# Patient Record
Sex: FEMALE | Race: WHITE | HISPANIC OR LATINO | Employment: UNEMPLOYED | URBAN - METROPOLITAN AREA
[De-identification: names, ages, dates, MRNs, and addresses within clinical notes are randomized per-mention and may not be internally consistent; named-entity substitution may affect disease eponyms.]

---

## 2017-01-26 RX ORDER — CITALOPRAM 20 MG/1
20 TABLET ORAL DAILY
Qty: 30 TAB | Refills: 4 | Status: SHIPPED | OUTPATIENT
Start: 2017-01-26 | End: 2017-04-04 | Stop reason: SDUPTHER

## 2017-01-26 RX ORDER — CITALOPRAM 20 MG/1
20 TABLET ORAL
COMMUNITY
Start: 2016-03-15 | End: 2017-01-26 | Stop reason: SDUPTHER

## 2017-01-26 NOTE — TELEPHONE ENCOUNTER
Was the patient seen in the last year in this department? Yes     Does patient have an active prescription for medications requested? No     Received Request Via: Patient     Patient also requesting refill on Augmentin

## 2017-03-17 ENCOUNTER — OFFICE VISIT (OUTPATIENT)
Dept: MEDICAL GROUP | Facility: MEDICAL CENTER | Age: 57
End: 2017-03-17
Payer: COMMERCIAL

## 2017-03-17 VITALS
HEART RATE: 78 BPM | HEIGHT: 69 IN | WEIGHT: 207 LBS | SYSTOLIC BLOOD PRESSURE: 128 MMHG | RESPIRATION RATE: 16 BRPM | TEMPERATURE: 98 F | OXYGEN SATURATION: 98 % | BODY MASS INDEX: 30.66 KG/M2 | DIASTOLIC BLOOD PRESSURE: 82 MMHG

## 2017-03-17 DIAGNOSIS — Z13.1 SCREENING FOR DIABETES MELLITUS: ICD-10-CM

## 2017-03-17 DIAGNOSIS — N39.0 RECURRENT UTI: ICD-10-CM

## 2017-03-17 DIAGNOSIS — Z13.0 SCREENING FOR DEFICIENCY ANEMIA: ICD-10-CM

## 2017-03-17 DIAGNOSIS — E55.9 VITAMIN D DEFICIENCY: ICD-10-CM

## 2017-03-17 DIAGNOSIS — Z12.31 ENCOUNTER FOR SCREENING MAMMOGRAM FOR BREAST CANCER: ICD-10-CM

## 2017-03-17 DIAGNOSIS — F51.01 PRIMARY INSOMNIA: ICD-10-CM

## 2017-03-17 DIAGNOSIS — E53.8 B12 DEFICIENCY: ICD-10-CM

## 2017-03-17 DIAGNOSIS — F34.1 DYSTHYMIA: ICD-10-CM

## 2017-03-17 DIAGNOSIS — N95.2 VAGINAL ATROPHY: ICD-10-CM

## 2017-03-17 DIAGNOSIS — Z13.29 SCREENING FOR THYROID DISORDER: ICD-10-CM

## 2017-03-17 DIAGNOSIS — E78.2 MIXED HYPERLIPIDEMIA: ICD-10-CM

## 2017-03-17 PROBLEM — E78.5 HYPERLIPIDEMIA: Status: ACTIVE | Noted: 2017-03-17

## 2017-03-17 PROCEDURE — 99214 OFFICE O/P EST MOD 30 MIN: CPT | Performed by: FAMILY MEDICINE

## 2017-03-17 RX ORDER — TRAZODONE HYDROCHLORIDE 50 MG/1
TABLET ORAL
Refills: 3 | COMMUNITY
Start: 2016-12-21 | End: 2017-04-04 | Stop reason: SDUPTHER

## 2017-03-17 RX ORDER — ESTRADIOL 10 UG/1
INSERT VAGINAL
COMMUNITY
Start: 2016-03-15 | End: 2018-01-30 | Stop reason: SDUPTHER

## 2017-03-17 RX ORDER — LOVASTATIN 20 MG/1
20 TABLET ORAL
COMMUNITY
Start: 2016-03-15 | End: 2017-03-31 | Stop reason: SDUPTHER

## 2017-03-17 RX ORDER — TRAZODONE HYDROCHLORIDE 50 MG/1
50 TABLET ORAL
COMMUNITY
Start: 2016-03-15 | End: 2017-11-03 | Stop reason: SDUPTHER

## 2017-03-17 RX ORDER — DICLOFENAC SODIUM 75 MG/1
75 TABLET, DELAYED RELEASE ORAL
COMMUNITY
Start: 2016-03-15 | End: 2017-03-31 | Stop reason: SDUPTHER

## 2017-03-17 RX ORDER — AMOXICILLIN AND CLAVULANATE POTASSIUM 875; 125 MG/1; MG/1
TABLET, FILM COATED ORAL
Refills: 3 | COMMUNITY
Start: 2017-01-27 | End: 2017-10-12 | Stop reason: SDUPTHER

## 2017-03-17 RX ORDER — ESTRADIOL 1 MG/1
1 TABLET ORAL
COMMUNITY
Start: 2016-03-15 | End: 2017-03-31 | Stop reason: SDUPTHER

## 2017-03-17 ASSESSMENT — PATIENT HEALTH QUESTIONNAIRE - PHQ9: CLINICAL INTERPRETATION OF PHQ2 SCORE: 2

## 2017-03-17 NOTE — MR AVS SNAPSHOT
"        Ave Dave   3/17/2017 9:40 AM   Office Visit   MRN: [id number]    Department:  [facility]   Dept Phone:  [phone]    Description:  Female : 1960   Provider:  Yandy Clark M.D.           Reason for Visit     Establish Care           Allergies as of 3/17/2017     Allergen Noted Reactions    Ciprofloxacin 2017   Swelling    Nitrofurantoin 2017   Anaphylaxis    Throat swelling    Rocephin  [Ceftriaxone] 2017   Anaphylaxis    Throat swelling/hives      You were diagnosed with     B12 deficiency   [099196]       Primary insomnia   [396566]       Vitamin D deficiency   [4638261]       Vaginal atrophy   [356634]       Mixed hyperlipidemia   [272.2.ICD-9-CM]       Dysthymia   [448286]       Recurrent UTI   [167432]       Screening for deficiency anemia   [798247]       Screening for diabetes mellitus   [V77.1.ICD-9-CM]       Screening for thyroid disorder   [V77.0.ICD-9-CM]       Encounter for screening mammogram for breast cancer   [6518533]         Vital Signs     Blood Pressure Pulse Temperature Respirations Height Weight    128/82 mmHg 78 36.7 °C (98 °F) 16 1.753 m (5' 9.02\") 93.895 kg (207 lb)    Body Mass Index Oxygen Saturation Smoking Status             30.55 kg/m2 98% Never Smoker          Basic Information     Date Of Birth Sex Race Ethnicity Preferred Language    1960 Female White Unknown English      Problem List              ICD-10-CM Priority Class Noted - Resolved    Dysthymia F34.1   3/15/2016 - Present    Vaginal atrophy N95.2   3/15/2016 - Present    Mixed hyperlipidemia E78.2   3/17/2017 - Present    Menopausal and perimenopausal disorder N95.9   3/15/2016 - Present    Vitamin D deficiency E55.9   2016 - Present    B12 deficiency E53.8   3/17/2017 - Present    Primary insomnia F51.01   3/17/2017 - Present    Recurrent UTI N39.0   3/17/2017 - Present      Health Maintenance        Date Due Completion Dates    MAMMOGRAM 3/11/2017 3/11/2016, " 3/11/2016    IMM DTaP/Tdap/Td Vaccine (2 - Td) 6/10/2025 6/10/2015    COLONOSCOPY 9/27/2026 9/27/2016            Current Immunizations     Influenza Vaccine Quad Inj (Pf) 10/22/2016    Tdap Vaccine 6/10/2015      Below and/or attached are the medications your provider expects you to take. Review all of your home medications and newly ordered medications with your provider and/or pharmacist. Follow medication instructions as directed by your provider and/or pharmacist. Please keep your medication list with you and share with your provider. Update the information when medications are discontinued, doses are changed, or new medications (including over-the-counter products) are added; and carry medication information at all times in the event of emergency situations     Allergies:  CIPROFLOXACIN - Swelling     NITROFURANTOIN - Anaphylaxis     Rocephin  - Anaphylaxis               Medications  Valid as of: March 17, 2017 - 10:53 AM    Generic Name Brand Name Tablet Size Instructions for use    Amoxicillin-Pot Clavulanate (Tab) AUGMENTIN 875-125 MG         Cholecalciferol (Cap) Cholecalciferol 95865 UNIT Take 50,000 Units by mouth.        Citalopram Hydrobromide (Tab) CELEXA 20 MG Take 1 Tab by mouth every day.        Diclofenac Sodium (Tablet Delayed Response) VOLTAREN 75 MG Take 75 mg by mouth.        Estradiol (Tab) ESTRACE 1 MG Take 1 mg by mouth.        Estradiol (Tab) VAGIFEM 10 MCG Insert  in vagina.        Lovastatin (Tab) MEVACOR 20 MG Take 20 mg by mouth.        TraZODone HCl (Tab) DESYREL 50 MG Take 50 mg by mouth.        TraZODone HCl (Tab) DESYREL 50 MG         .                 Medicines prescribed today were sent to:     [facility] [place], [place] - [address] [address]    Phone: [phone] Fax: [phone]    Open 24 Hours?: No    [facility] [address] [place] [address], [place] - [address]    [address] [facility] [facility] [address]    Phone:  [phone] Fax: [phone]    Open 24 Hours?: No      Medication refill instructions:       If your prescription bottle indicates you have medication refills left, it is not necessary to call your provider’s office. Please contact your pharmacy and they will refill your medication.    If your prescription bottle indicates you do not have any refills left, you may request refills at any time through one of the following ways: The online Possible Web system (except Urgent Care), by calling your provider’s office, or by asking your pharmacy to contact your provider’s office with a refill request. Medication refills are processed only during regular business hours and may not be available until the next business day. Your provider may request additional information or to have a follow-up visit with you prior to refilling your medication.   *Please Note: Medication refills are assigned a new Rx number when refilled electronically. Your pharmacy may indicate that no refills were authorized even though a new prescription for the same medication is available at the pharmacy. Please request the medicine by name with the pharmacy before contacting your provider for a refill.        Your To Do List     Future Labs/Procedures Complete By Expires    MA-SCREEN MAMMO W/CAD-BILAT  As directed 4/18/2018         Possible Web Access Code: [email or web address]  Expires: 4/16/2017 10:53 AM    Your email address is not on file at [facility].  Email Addresses are required for you to sign up for Possible Web, please contact [phone] to verify your personal information and to provide your email address prior to attempting to register for Possible Web.    Ave Dave  [address]  [place] [address]    Possible Web  A secure, online tool to manage your health information     [employer]’s Possible Web® is a secure, online tool that connects you to your personalized health information from the privacy of your home -- day or night - making it very  easy for you to manage your healthcare. Once the activation process is completed, you can even access your medical information using the Spectralmind alan, which is available for free in the Apple Alan store or Google Play store.     To learn more about Spectralmind, visit [email or web address]    There are two levels of access available (as shown below):   My Chart Features  [facility] Doctor [facility]  Specialists [facility]  Urgent  Care Non-[facility] Doctor   Email your healthcare team securely and privately 24/7 X X X    Manage appointments: schedule your next appointment; view details of past/upcoming appointments X      Request prescription refills. X      View recent personal medical records, including lab and immunizations X X X X   View health record, including health history, allergies, medications X X X X   Read reports about your outpatient visits, procedures, consult and ER notes X X X X   See your discharge summary, which is a recap of your hospital and/or ER visit that includes your diagnosis, lab results, and care plan X X  X     How to register for Spectralmind:  Once your e-mail address has been verified, follow the following steps to sign up for Spectralmind.     1. Go to  [email or web address]  2. Click on the Sign Up Now box, which takes you to the New Member Sign Up page. You will need to provide the following information:  a. Enter your Spectralmind Access Code exactly as it appears at the top of this page. (You will not need to use this code after you’ve completed the sign-up process. If you do not sign up before the expiration date, you must request a new code.)   b. Enter your date of birth.   c. Enter your home email address.   d. Click Submit, and follow the next screen’s instructions.  3. Create a [employer] ID. This will be your Spectralmind login ID and cannot be changed, so think of one that is secure and easy to remember.  4. Create a [employer] password. You can change your password at any  time.  5. Enter your Password Reset Question and Answer. This can be used at a later time if you forget your password.   6. Enter your e-mail address. This allows you to receive e-mail notifications when new information is available in [employer].  7. Click Sign Up. You can now view your health information.    For assistance activating your [employer] account, call [phone]

## 2017-03-22 LAB
25(OH)D3+25(OH)D2 SERPL-MCNC: 56.9 NG/ML (ref 30–100)
ALBUMIN SERPL-MCNC: 4.2 G/DL (ref 3.5–5.5)
ALBUMIN/GLOB SERPL: 1.8 {RATIO} (ref 1.2–2.2)
ALP SERPL-CCNC: 83 IU/L (ref 39–117)
ALT SERPL-CCNC: 30 IU/L (ref 0–32)
AST SERPL-CCNC: 22 IU/L (ref 0–40)
BASOPHILS # BLD AUTO: 0 X10E3/UL (ref 0–0.2)
BASOPHILS NFR BLD AUTO: 1 %
BILIRUB SERPL-MCNC: 0.4 MG/DL (ref 0–1.2)
BUN SERPL-MCNC: 14 MG/DL (ref 6–24)
BUN/CREAT SERPL: 20 (ref 9–23)
CALCIUM SERPL-MCNC: 9.1 MG/DL (ref 8.7–10.2)
CHLORIDE SERPL-SCNC: 105 MMOL/L (ref 96–106)
CHOLEST SERPL-MCNC: 187 MG/DL (ref 100–199)
CHOLEST/HDLC SERPL: 2.7 RATIO UNITS (ref 0–4.4)
CO2 SERPL-SCNC: 22 MMOL/L (ref 18–29)
COMMENT 011824: NORMAL
CREAT SERPL-MCNC: 0.69 MG/DL (ref 0.57–1)
EOSINOPHIL # BLD AUTO: 0.2 X10E3/UL (ref 0–0.4)
EOSINOPHIL NFR BLD AUTO: 5 %
ERYTHROCYTE [DISTWIDTH] IN BLOOD BY AUTOMATED COUNT: 14 % (ref 12.3–15.4)
GLOBULIN SER CALC-MCNC: 2.3 G/DL (ref 1.5–4.5)
GLUCOSE SERPL-MCNC: 89 MG/DL (ref 65–99)
HCT VFR BLD AUTO: 43.1 % (ref 34–46.6)
HDLC SERPL-MCNC: 70 MG/DL
HGB BLD-MCNC: 14.2 G/DL (ref 11.1–15.9)
IMM GRANULOCYTES # BLD: 0 X10E3/UL (ref 0–0.1)
IMM GRANULOCYTES NFR BLD: 0 %
IMMATURE CELLS  115398: NORMAL
LDLC SERPL CALC-MCNC: 92 MG/DL (ref 0–99)
LYMPHOCYTES # BLD AUTO: 1.8 X10E3/UL (ref 0.7–3.1)
LYMPHOCYTES NFR BLD AUTO: 37 %
MCH RBC QN AUTO: 29.5 PG (ref 26.6–33)
MCHC RBC AUTO-ENTMCNC: 32.9 G/DL (ref 31.5–35.7)
MCV RBC AUTO: 89 FL (ref 79–97)
MONOCYTES # BLD AUTO: 0.4 X10E3/UL (ref 0.1–0.9)
MONOCYTES NFR BLD AUTO: 7 %
MORPHOLOGY BLD-IMP: NORMAL
NEUTROPHILS # BLD AUTO: 2.4 X10E3/UL (ref 1.4–7)
NEUTROPHILS NFR BLD AUTO: 50 %
NRBC BLD AUTO-RTO: NORMAL %
PLATELET # BLD AUTO: 246 X10E3/UL (ref 150–379)
POTASSIUM SERPL-SCNC: 4.3 MMOL/L (ref 3.5–5.2)
PROT SERPL-MCNC: 6.5 G/DL (ref 6–8.5)
RBC # BLD AUTO: 4.82 X10E6/UL (ref 3.77–5.28)
SODIUM SERPL-SCNC: 144 MMOL/L (ref 134–144)
T4 FREE SERPL-MCNC: 1.1 NG/DL (ref 0.82–1.77)
TRIGL SERPL-MCNC: 123 MG/DL (ref 0–149)
TSH SERPL DL<=0.005 MIU/L-ACNC: 2.56 UIU/ML (ref 0.45–4.5)
VLDLC SERPL CALC-MCNC: 25 MG/DL (ref 5–40)
WBC # BLD AUTO: 4.8 X10E3/UL (ref 3.4–10.8)

## 2017-03-23 ENCOUNTER — TELEPHONE (OUTPATIENT)
Dept: MEDICAL GROUP | Facility: MEDICAL CENTER | Age: 57
End: 2017-03-23

## 2017-03-23 NOTE — TELEPHONE ENCOUNTER
----- Message from Yandy Clark M.D. sent at 3/23/2017 10:12 AM PDT -----  Please notify patient of their normal lab results.  They look fabulous.  Yandy Clark M.D.

## 2017-03-24 NOTE — ASSESSMENT & PLAN NOTE
Patient has a history of vitamin B-12  deficiency and was getting monthly B12 shots. She hasn't had one in about 10 months now. We are going arrange for her daughter who is a medical assistant to give her monthly shots at home.

## 2017-03-24 NOTE — ASSESSMENT & PLAN NOTE
Patient is currently using Vagifem twice a week and this seems to be working well. It has helped with the dyspareunia. She denies any vaginal bleeding.

## 2017-03-24 NOTE — PROGRESS NOTES
Chief Complaint   Patient presents with   • Establish Care      Ave is here to establish care at this office. she is a previous patient of mine at the [facility].      Ave Dave is a 56 y.o. female here to establish care and for evaluation and management of:        HPI:    Vitamin D deficiency  She has a history of vitamin D deficiency. She's been taking her vitamin D 50,000 units once a week. She notes that when she doesn't take the medication regularly she becomes more fatigued. She is due for labs. She denies any recent fractures.    Vaginal atrophy  Patient is currently using Vagifem twice a week and this seems to be working well. It has helped with the dyspareunia. She denies any vaginal bleeding.    Recurrent UTI  Patient has a history of recurrent UTIs. She is on Augmentin that she takes after sex or when she starts having symptoms. She has multiple drug allergies and this is truly the only medicine that works. She knows to call if she gets nausea, vomiting, flank pain or high fever.    Primary insomnia  Patient has a history of primary insomnia with difficulty getting to sleep most nights. She was started on trazodone and that has been very effective in treating this. Patient reports she wakes rested.    Mixed hyperlipidemia  Patient has a history of hyperlipidemia. This is been well controlled with lovastatin 20 mg daily. She is due for labs. She denies any abdominal pain, excess muscle pain, dark urine or jaundice.    Dysthymia  Patient's dysthymia has been well-controlled with the citalopram 20 mg daily. She is feeling happy overall and denies any suicidal thoughts or plans.    B12 deficiency  Patient has a history of vitamin B-12  deficiency and was getting monthly B12 shots. She hasn't had one in about 10 months now. We are going arrange for her daughter who is a medical assistant to give her monthly shots at home.        Allergies   Allergen Reactions   • Ciprofloxacin Swelling   • Nitrofurantoin  Anaphylaxis     Throat swelling   • Rocephin  [Ceftriaxone] Anaphylaxis     Throat swelling/hives       Current medicines (including changes today)  Current Outpatient Prescriptions   Medication Sig Dispense Refill   • Cholecalciferol (D3-50) 02472 UNIT Cap Take 50,000 Units by mouth.     • diclofenac EC (VOLTAREN) 75 MG Tablet Delayed Response Take 75 mg by mouth.     • estradiol (ESTRACE) 1 MG Tab Take 1 mg by mouth.     • estradiol (VAGIFEM) 10 MCG Tab Insert  in vagina.     • lovastatin (MEVACOR) 20 MG Tab Take 20 mg by mouth.     • trazodone (DESYREL) 50 MG Tab Take 50 mg by mouth.     • citalopram (CELEXA) 20 MG Tab Take 1 Tab by mouth every day. 30 Tab 4   • amoxicillin-clavulanate (AUGMENTIN) 875-125 MG Tab   3   • trazodone (DESYREL) 50 MG Tab   3     No current facility-administered medications for this visit.     She  has a past medical history of Vitamin D deficiency (2016); Vaginal atrophy (3/15/2016); Recurrent UTI (3/17/2017); Mixed hyperlipidemia (3/17/2017); Dysthymia (3/15/2016); and B12 deficiency (3/17/2017).  She  has past surgical history that includes abdominal hysterectomy total.  Social History   Substance Use Topics   • Smoking status: Never Smoker    • Smokeless tobacco: Never Used   • Alcohol Use: No     Social History     Social History Narrative     Family History   Problem Relation Age of Onset   • Cancer Mother 80     colon   • Heart Disease Father 86   • Other Brother      trauma     Family Status   Relation Status Death Age   • Mother     • Father     • Sister Alive    • Brother Alive    • Sister Alive    • Sister Alive    • Sister Alive    • Brother           ROS  No fever or chills.  No nausea or vomiting.  No chest pain or palpitations.  No cough or SOB.  No pain with urination or hematuria.  No black or bloody stools.  All other systems reviewed and are negative     Objective:     Blood pressure 128/82, pulse 78, temperature 36.7 °C (98 °F), resp.  "rate 16, height 1.753 m (5' 9.02\"), weight 93.895 kg (207 lb), SpO2 98 %. Body mass index is 30.55 kg/(m^2).  Physical Exam:      Well developed, well nourished.  Alert, oriented in no acute distress.  Psych: Eye contact is good, speech goal directed, affect calm  Eyes: conjunctiva non-injected, sclera non-icteric.  Neck Supple.  No adenopathy or masses in the neck or supraclavicular regions. No thyromegaly  Lungs: clear to auscultation bilaterally with good excursion. No wheezes or rhonchi  CV: regular rate and rhythm. No murmur  Abdomen: soft, nontender, no masses or organomegaly.  No rebound or guarding  Ext: no edema, color normal, vascularity normal, temperature normal      Assessment and Plan:   The following treatment plan was discussed    1. B12 deficiency  We will arrange for home injections.  - CBC WITH DIFFERENTIAL    2. Primary insomnia  Continue trazodone    3. Vitamin D deficiency  Check vitamin D level and adjust supplementation as needed  - VITAMIN D 25-HYDROXY    4. Vaginal atrophy  Continue Vagifem twice a week. Patient has plenty of medication at this time    5. Mixed hyperlipidemia  Check lipid panel. Adjust Mevacor as needed  - LIPID PANEL W/ CHOL/HDL RATIO    6. Dysthymia  Stable. Continue citalopram 20 mg daily.    7. Recurrent UTI  Refill Augmentin as needed.    8. Screening for deficiency anemia  Screening labs ordered.  Await results for counseling.    - CBC WITH DIFFERENTIAL    9. Screening for diabetes mellitus  Screening labs ordered.  Await results for counseling.    - COMP METABOLIC PANEL    10. Screening for thyroid disorder  Screening labs ordered.  Await results for counseling.    - TSH+FREE T4    11. Encounter for screening mammogram for breast cancer    - MA-SCREEN MAMMO W/CAD-BILAT; Future      Records reviewed    Any change or worsening of signs or symptoms, patient encouraged to follow-up or report to the emergency room for further evaluation. Patient understands and " agrees.    Followup: Return in about 1 year (around 3/17/2018).

## 2017-03-24 NOTE — ASSESSMENT & PLAN NOTE
She has a history of vitamin D deficiency. She's been taking her vitamin D 50,000 units once a week. She notes that when she doesn't take the medication regularly she becomes more fatigued. She is due for labs. She denies any recent fractures.

## 2017-03-24 NOTE — ASSESSMENT & PLAN NOTE
Patient has a history of recurrent UTIs. She is on Augmentin that she takes after sex or when she starts having symptoms. She has multiple drug allergies and this is truly the only medicine that works. She knows to call if she gets nausea, vomiting, flank pain or high fever.

## 2017-03-24 NOTE — ASSESSMENT & PLAN NOTE
Patient's dysthymia has been well-controlled with the citalopram 20 mg daily. She is feeling happy overall and denies any suicidal thoughts or plans.

## 2017-03-24 NOTE — ASSESSMENT & PLAN NOTE
Patient has a history of hyperlipidemia. This is been well controlled with lovastatin 20 mg daily. She is due for labs. She denies any abdominal pain, excess muscle pain, dark urine or jaundice.

## 2017-03-31 RX ORDER — LOVASTATIN 20 MG/1
20 TABLET ORAL DAILY
Qty: 90 TAB | Refills: 3 | Status: SHIPPED | OUTPATIENT
Start: 2017-03-31 | End: 2018-04-05 | Stop reason: SDUPTHER

## 2017-03-31 RX ORDER — ESTRADIOL 1 MG/1
1 TABLET ORAL DAILY
Qty: 90 TAB | Refills: 3 | Status: SHIPPED | OUTPATIENT
Start: 2017-03-31 | End: 2018-02-23

## 2017-03-31 RX ORDER — DICLOFENAC SODIUM 75 MG/1
75 TABLET, DELAYED RELEASE ORAL 2 TIMES DAILY PRN
Qty: 60 TAB | Refills: 6 | Status: SHIPPED | OUTPATIENT
Start: 2017-03-31 | End: 2018-05-15 | Stop reason: SDUPTHER

## 2017-03-31 NOTE — TELEPHONE ENCOUNTER
Was the patient seen in the last year in this department? Yes     Does patient have an active prescription for medications requested? No     Received Request Via: Pharmacy     Last seen: 3/17/2017 [place]

## 2017-03-31 NOTE — TELEPHONE ENCOUNTER
Was the patient seen in the last year in this department? Yes     Does patient have an active prescription for medications requested? No     Received Request Via: Pharmacy     Last seen: 03/17/2017

## 2017-04-04 RX ORDER — TRAZODONE HYDROCHLORIDE 50 MG/1
50 TABLET ORAL
Qty: 30 TAB | Refills: 6 | Status: SHIPPED | OUTPATIENT
Start: 2017-04-04 | End: 2019-03-20

## 2017-04-04 RX ORDER — CITALOPRAM 20 MG/1
20 TABLET ORAL DAILY
Qty: 30 TAB | Refills: 6 | Status: SHIPPED | OUTPATIENT
Start: 2017-04-04 | End: 2017-11-03 | Stop reason: SDUPTHER

## 2017-04-04 NOTE — TELEPHONE ENCOUNTER
Was the patient seen in the last year in this department? Yes     Does patient have an active prescription for medications requested? No     Received Request Via: Pharmacy     Last seen: 03/17/2017 [place]

## 2017-04-27 DIAGNOSIS — R30.0 DYSURIA: ICD-10-CM

## 2017-04-29 LAB
APPEARANCE UR: CLEAR
BACTERIA #/AREA URNS HPF: NORMAL /[HPF]
BILIRUB UR QL STRIP: NEGATIVE
CASTS URNS MICRO: NORMAL
CASTS URNS QL MICRO: NORMAL
COLOR UR: YELLOW
CRYSTALS URNS MICRO: NORMAL
EPI CELLS #/AREA URNS HPF: NORMAL /HPF
GLUCOSE UR QL: NEGATIVE
HGB UR QL STRIP: NEGATIVE
KETONES UR QL STRIP: NEGATIVE
LEUKOCYTE ESTERASE UR QL STRIP: NEGATIVE
MICRO URNS: NORMAL
MICRO URNS: NORMAL
MUCOUS THREADS URNS QL MICRO: NORMAL
NITRITE UR QL STRIP: NEGATIVE
PH UR STRIP: 7 [PH] (ref 5–7.5)
PROT UR QL STRIP: NEGATIVE
RBC #/AREA URNS HPF: NORMAL /HPF
RENAL EPI CELLS #/AREA URNS HPF: NORMAL /[HPF]
SP GR UR: 1 (ref 1–1.03)
T VAGINALIS URNS QL MICRO: NORMAL
UNIDENT CRYS URNS QL MICRO: NORMAL
URINALYSIS REFLEX  377202: NORMAL
URNS CMNT MICRO: NORMAL
UROBILINOGEN UR STRIP-MCNC: 0.2 MG/DL (ref 0.2–1)
WBC #/AREA URNS HPF: NORMAL /HPF
YEAST #/AREA URNS HPF: NORMAL /[HPF]

## 2017-05-02 ENCOUNTER — TELEPHONE (OUTPATIENT)
Dept: MEDICAL GROUP | Facility: MEDICAL CENTER | Age: 57
End: 2017-05-02

## 2017-05-02 NOTE — TELEPHONE ENCOUNTER
----- Message from Yandy Clark M.D. sent at 5/2/2017 12:33 PM PDT -----  Please notify patient of their normal lab results.  There was no infection.  Yandy Clark M.D.

## 2017-05-11 ENCOUNTER — TELEPHONE (OUTPATIENT)
Dept: MEDICAL GROUP | Facility: MEDICAL CENTER | Age: 57
End: 2017-05-11

## 2017-05-11 RX ORDER — CODEINE PHOSPHATE/GUAIFENESIN 10-100MG/5
5 LIQUID (ML) ORAL 3 TIMES DAILY PRN
Qty: 180 ML | Refills: 0 | Status: SHIPPED | OUTPATIENT
Start: 2017-05-11 | End: 2017-10-12 | Stop reason: SDUPTHER

## 2017-05-11 NOTE — TELEPHONE ENCOUNTER
1. Caller Name: Pt                      Call Back Number: [phone] (home)       2. Message: Cough x 2 weeks, She has taken Augmentin and not better. Should she be seen? Or have something else prescribed      3. Patient approves office to leave a detailed voicemail/MyChart message: yes

## 2017-05-12 RX ORDER — AZITHROMYCIN 250 MG/1
TABLET, FILM COATED ORAL
Qty: 6 TAB | Refills: 0 | Status: SHIPPED | OUTPATIENT
Start: 2017-05-12 | End: 2017-05-17

## 2017-08-17 ENCOUNTER — OFFICE VISIT (OUTPATIENT)
Dept: MEDICAL GROUP | Facility: MEDICAL CENTER | Age: 57
End: 2017-08-17
Payer: COMMERCIAL

## 2017-08-17 VITALS
BODY MASS INDEX: 29.92 KG/M2 | WEIGHT: 202 LBS | HEART RATE: 55 BPM | TEMPERATURE: 98.1 F | RESPIRATION RATE: 16 BRPM | HEIGHT: 69 IN | DIASTOLIC BLOOD PRESSURE: 78 MMHG | OXYGEN SATURATION: 98 % | SYSTOLIC BLOOD PRESSURE: 120 MMHG

## 2017-08-17 DIAGNOSIS — H53.9 VISUAL CHANGES: ICD-10-CM

## 2017-08-17 DIAGNOSIS — G44.229 CHRONIC TENSION-TYPE HEADACHE, NOT INTRACTABLE: ICD-10-CM

## 2017-08-17 PROCEDURE — 99213 OFFICE O/P EST LOW 20 MIN: CPT | Performed by: FAMILY MEDICINE

## 2017-08-17 RX ORDER — CYCLOBENZAPRINE HCL 5 MG
5-10 TABLET ORAL 3 TIMES DAILY PRN
Qty: 30 TAB | Refills: 0 | Status: SHIPPED | OUTPATIENT
Start: 2017-08-17 | End: 2018-12-04

## 2017-08-17 NOTE — MR AVS SNAPSHOT
"        Ave Dave   2017 10:00 AM   Office Visit   MRN: [id number]    Department:  [facility]   Dept Phone:  [phone]    Description:  Female : 1960   Provider:  Yandy Clark M.D.           Reason for Visit     Headache           Allergies as of 2017     Allergen Noted Reactions    Ciprofloxacin 2017   Swelling    Nitrofurantoin 2017   Anaphylaxis    Throat swelling    Rocephin  [Ceftriaxone] 2017   Anaphylaxis    Throat swelling/hives      You were diagnosed with     Chronic tension-type headache, not intractable   [756828]       Visual changes   [698684]         Vital Signs     Blood Pressure Pulse Temperature Respirations Height Weight    120/78 mmHg 55 36.7 °C (98.1 °F) 16 1.753 m (5' 9.02\") 91.627 kg (202 lb)    Body Mass Index Oxygen Saturation Smoking Status             29.82 kg/m2 98% Never Smoker          Basic Information     Date Of Birth Sex Race Ethnicity Preferred Language    1960 Female White  Origin (C[place]ican,Malian,Niuean,Dileep, etc) English      Problem List              ICD-10-CM Priority Class Noted - Resolved    Dysthymia F34.1   3/15/2016 - Present    Vaginal atrophy N95.2   3/15/2016 - Present    Mixed hyperlipidemia E78.2   3/17/2017 - Present    Menopausal and perimenopausal disorder N95.9   3/15/2016 - Present    Vitamin D deficiency E55.9   2016 - Present    B12 deficiency E53.8   3/17/2017 - Present    Primary insomnia F51.01   3/17/2017 - Present    Recurrent UTI N39.0   3/17/2017 - Present    Chronic tension-type headache, not intractable G44.229   2017 - Present    Visual changes H53.9   2017 - Present      Health Maintenance        Date Due Completion Dates    MAMMOGRAM 3/11/2017 3/11/2016    IMM INFLUENZA (1) 2017 10/22/2016    IMM DTaP/Tdap/Td Vaccine (2 - Td) 6/10/2025 6/10/2015    COLONOSCOPY 2026            Current Immunizations     Influenza Vaccine Quad Inj (Pf) " 10/22/2016    Tdap Vaccine 6/10/2015      Below and/or attached are the medications your provider expects you to take. Review all of your home medications and newly ordered medications with your provider and/or pharmacist. Follow medication instructions as directed by your provider and/or pharmacist. Please keep your medication list with you and share with your provider. Update the information when medications are discontinued, doses are changed, or new medications (including over-the-counter products) are added; and carry medication information at all times in the event of emergency situations     Allergies:  CIPROFLOXACIN - Swelling     NITROFURANTOIN - Anaphylaxis     Rocephin  - Anaphylaxis               Medications  Valid as of: August 17, 2017 - 10:56 AM    Generic Name Brand Name Tablet Size Instructions for use    Amoxicillin-Pot Clavulanate (Tab) AUGMENTIN 875-125 MG         Cholecalciferol (Cap) Cholecalciferol 64114 UNIT Take 1 Cap by mouth every 7 days.        Citalopram Hydrobromide (Tab) CELEXA 20 MG Take 1 Tab by mouth every day.        Cyclobenzaprine HCl (Tab) FLEXERIL 5 MG Take 1-2 Tabs by mouth 3 times a day as needed.        Diclofenac Sodium (Tablet Delayed Response) VOLTAREN 75 MG Take 1 Tab by mouth 2 times a day as needed.        Estradiol (Tab) VAGIFEM 10 MCG Insert  in vagina.        Estradiol (Tab) ESTRACE 1 MG Take 1 Tab by mouth every day.        Guaifenesin-Codeine (Syrup) TUSSI-ORGANIDIN -10 MG/5ML Take 5 mL by mouth 3 times a day as needed for Cough.        Lovastatin (Tab) MEVACOR 20 MG Take 1 Tab by mouth every day.        TraZODone HCl (Tab) DESYREL 50 MG Take 50 mg by mouth.        TraZODone HCl (Tab) DESYREL 50 MG Take 1 Tab by mouth every bedtime.        .                 Medicines prescribed today were sent to:     [facility] - [place], [address] [address]    Phone: [phone] Fax: [phone]    Open 24  Hours?: No    [id number] - [place], [place] - [address]    [address] [place] [address]    Phone: [phone] Fax: [phone]    Open 24 Hours?: No      Medication refill instructions:       If your prescription bottle indicates you have medication refills left, it is not necessary to call your provider’s office. Please contact your pharmacy and they will refill your medication.    If your prescription bottle indicates you do not have any refills left, you may request refills at any time through one of the following ways: The online Digital Folio system (except Urgent Care), by calling your provider’s office, or by asking your pharmacy to contact your provider’s office with a refill request. Medication refills are processed only during regular business hours and may not be available until the next business day. Your provider may request additional information or to have a follow-up visit with you prior to refilling your medication.   *Please Note: Medication refills are assigned a new Rx number when refilled electronically. Your pharmacy may indicate that no refills were authorized even though a new prescription for the same medication is available at the pharmacy. Please request the medicine by name with the pharmacy before contacting your provider for a refill.           Digital Folio Access Code: [email or web address]  Expires: 9/16/2017 10:56 AM    Digital Folio  A secure, online tool to manage your health information     [employer] Digital Folio® is a secure, online tool that connects you to your personalized health information from the privacy of your home -- day or night - making it very easy for you to manage your healthcare. Once the activation process is completed, you can even access your medical information using the Digital Folio alan, which is available for free in the Apple Alan store or Google Play store.     Digital Folio provides the following levels of access (as shown below):   My Chart Features   [facility]  Primary Care Doctor [facility]  Specialists [facility]  Urgent  Care Non-[facility]  Primary Care  Doctor   Email your healthcare team securely and privately 24/7 X X X    Manage appointments: schedule your next appointment; view details of past/upcoming appointments X      Request prescription refills. X      View recent personal medical records, including lab and immunizations X X X X   View health record, including health history, allergies, medications X X X X   Read reports about your outpatient visits, procedures, consult and ER notes X X X X   See your discharge summary, which is a recap of your hospital and/or ER visit that includes your diagnosis, lab results, and care plan. X X       How to register for [employer]:  1. Go to  [email or web address].  2. Click on the Sign Up Now box, which takes you to the New Member Sign Up page. You will need to provide the following information:  a. Enter your [employer] Access Code exactly as it appears at the top of this page. (You will not need to use this code after you’ve completed the sign-up process. If you do not sign up before the expiration date, you must request a new code.)   b. Enter your date of birth.   c. Enter your home email address.   d. Click Submit, and follow the next screen’s instructions.  3. Create a [employer] ID. This will be your [employer] login ID and cannot be changed, so think of one that is secure and easy to remember.  4. Create a [employer] password. You can change your password at any time.  5. Enter your Password Reset Question and Answer. This can be used at a later time if you forget your password.   6. Enter your e-mail address. This allows you to receive e-mail notifications when new information is available in [employer].  7. Click Sign Up. You can now view your health information.    For assistance activating your [employer] account, call [phone]

## 2017-08-25 NOTE — PROGRESS NOTES
Subjective:     Chief Complaint   Patient presents with   • Headache       Ave Dave is a 57 y.o. female here today for evaluation and management of:    Visual changes  Patient is having difficulty reading as well as seeing distances. She is unsure if this is causing her headaches. She is scheduled to see the eye doctor this weekend. She denies any trauma to the eye. No loss of visual fields or blackened areas.    Chronic tension-type headache, not intractable  Patient complains of headaches in the frontal area as well as the occipital area. The pain radiates down to her neck. She reports feels better if she rubs it. She denies any head trauma. She has not had any numbness or tingling. She takes ibuprofen or Voltaren and this helps that does not solve the problem. She has not tried any ice to the area.       Allergies   Allergen Reactions   • Ciprofloxacin Swelling   • Nitrofurantoin Anaphylaxis     Throat swelling   • Rocephin  [Ceftriaxone] Anaphylaxis     Throat swelling/hives       Current medicines (including changes today)  Current Outpatient Prescriptions   Medication Sig Dispense Refill   • cyclobenzaprine (FLEXERIL) 5 MG tablet Take 1-2 Tabs by mouth 3 times a day as needed. 30 Tab 0   • guaifenesin-codeine (TUSSI-ORGANIDIN NR) 100-10 MG/5ML syrup Take 5 mL by mouth 3 times a day as needed for Cough. 180 mL 0   • Cholecalciferol (D3-50) 64227 UNIT Cap Take 1 Cap by mouth every 7 days. 12 Cap 3   • citalopram (CELEXA) 20 MG Tab Take 1 Tab by mouth every day. 30 Tab 6   • trazodone (DESYREL) 50 MG Tab Take 1 Tab by mouth every bedtime. 30 Tab 6   • diclofenac EC (VOLTAREN) 75 MG Tablet Delayed Response Take 1 Tab by mouth 2 times a day as needed. 60 Tab 6   • estradiol (ESTRACE) 1 MG Tab Take 1 Tab by mouth every day. 90 Tab 3   • lovastatin (MEVACOR) 20 MG Tab Take 1 Tab by mouth every day. 90 Tab 3   • estradiol (VAGIFEM) 10 MCG Tab Insert  in vagina.     • trazodone (DESYREL) 50 MG Tab Take 50  "mg by mouth.     • amoxicillin-clavulanate (AUGMENTIN) 875-125 MG Tab   3     No current facility-administered medications for this visit.       She  has a past medical history of Vitamin D deficiency (2/29/2016); Vaginal atrophy (3/15/2016); Recurrent UTI (3/17/2017); Mixed hyperlipidemia (3/17/2017); Dysthymia (3/15/2016); and B12 deficiency (3/17/2017).    Patient Active Problem List    Diagnosis Date Noted   • Chronic tension-type headache, not intractable 08/17/2017   • Visual changes 08/17/2017   • Mixed hyperlipidemia 03/17/2017   • B12 deficiency 03/17/2017   • Primary insomnia 03/17/2017   • Recurrent UTI 03/17/2017   • Dysthymia 03/15/2016   • Vaginal atrophy 03/15/2016   • Menopausal and perimenopausal disorder 03/15/2016   • Vitamin D deficiency 02/29/2016       ROS   No fever or chills.  No nausea or vomiting.  No chest pain or palpitations.  No cough or SOB.  No pain with urination or hematuria.  No black or bloody stools.       Objective:     Blood pressure 120/78, pulse 55, temperature 36.7 °C (98.1 °F), resp. rate 16, height 1.753 m (5' 9.02\"), weight 91.627 kg (202 lb), SpO2 98 %. Body mass index is 29.82 kg/(m^2).   Physical Exam:  Well developed, well nourished.  Alert, oriented in no acute distress.  Eye contact is good, speech goal directed, affect calm  Eyes: conjunctiva non-injected, sclera non-icteric.PERRL. EOMs intact. Visual acuity 20/40  Ears: Pinna normal. TM pearly gray.   Nose: Nares are patent.  Normal mucosa  Mouth: Oral mucous membranes pink and moist with no lesions.  Neck Supple.  No adenopathy or masses in the neck or supraclavicular regions. No thyromegaly.  No visible deformity. No spinal tenderness to palpation but tenderness to palpation of the bilateral strap muscles as well as trapezius. ROM intact. Spurling's test negative.   Lungs: clear to auscultation bilaterally with good excursion. No wheezes or rhonchi  CV: regular rate and rhythm. No murmur  Head: Normocephalic " atraumatic  Neurological: Alert and oriented x 3. DTRs 2+ and symmetric. No cranial nerve deficit. Strength and sensation intact. Negative Rhomberg. No dysdiadochokinesia.             Assessment and Plan:   The following treatment plan was discussed    1. Chronic tension-type headache, not intractable  Flexeril as directed. Consider physical therapy. Encouraged patient to follow up with up, treat for visual check as this could be contributing to her headaches. If headaches worsen consider MRI  - cyclobenzaprine (FLEXERIL) 5 MG tablet; Take 1-2 Tabs by mouth 3 times a day as needed.  Dispense: 30 Tab; Refill: 0    2. Visual changes  Encouraged follow-up with optometry.    Any change or worsening of signs or symptoms, patient encouraged to follow-up or report to the emergency room for further evaluation. Patient understands and agrees.    Followup: Return if symptoms worsen or fail to improve.

## 2017-08-25 NOTE — ASSESSMENT & PLAN NOTE
Patient is having difficulty reading as well as seeing distances. She is unsure if this is causing her headaches. She is scheduled to see the eye doctor this weekend. She denies any trauma to the eye. No loss of visual fields or blackened areas.

## 2017-08-25 NOTE — ASSESSMENT & PLAN NOTE
Patient complains of headaches in the frontal area as well as the occipital area. The pain radiates down to her neck. She reports feels better if she rubs it. She denies any head trauma. She has not had any numbness or tingling. She takes ibuprofen or Voltaren and this helps that does not solve the problem. She has not tried any ice to the area.

## 2017-10-12 ENCOUNTER — OFFICE VISIT (OUTPATIENT)
Dept: MEDICAL GROUP | Facility: MEDICAL CENTER | Age: 57
End: 2017-10-12
Payer: COMMERCIAL

## 2017-10-12 VITALS
SYSTOLIC BLOOD PRESSURE: 132 MMHG | HEART RATE: 65 BPM | HEIGHT: 69 IN | OXYGEN SATURATION: 100 % | WEIGHT: 210 LBS | TEMPERATURE: 97.5 F | BODY MASS INDEX: 31.1 KG/M2 | DIASTOLIC BLOOD PRESSURE: 86 MMHG

## 2017-10-12 DIAGNOSIS — H66.003 ACUTE SUPPURATIVE OTITIS MEDIA OF BOTH EARS WITHOUT SPONTANEOUS RUPTURE OF TYMPANIC MEMBRANES, RECURRENCE NOT SPECIFIED: ICD-10-CM

## 2017-10-12 DIAGNOSIS — J40 BRONCHITIS: ICD-10-CM

## 2017-10-12 DIAGNOSIS — Z23 NEED FOR VACCINATION: ICD-10-CM

## 2017-10-12 PROCEDURE — 90686 IIV4 VACC NO PRSV 0.5 ML IM: CPT | Performed by: FAMILY MEDICINE

## 2017-10-12 PROCEDURE — 90471 IMMUNIZATION ADMIN: CPT | Performed by: FAMILY MEDICINE

## 2017-10-12 PROCEDURE — 99214 OFFICE O/P EST MOD 30 MIN: CPT | Mod: 25 | Performed by: FAMILY MEDICINE

## 2017-10-12 RX ORDER — AMOXICILLIN AND CLAVULANATE POTASSIUM 875; 125 MG/1; MG/1
1 TABLET, FILM COATED ORAL 2 TIMES DAILY
Qty: 20 TAB | Refills: 3 | Status: SHIPPED | OUTPATIENT
Start: 2017-10-12 | End: 2018-11-07 | Stop reason: SDUPTHER

## 2017-10-12 RX ORDER — CODEINE PHOSPHATE/GUAIFENESIN 10-100MG/5
5 LIQUID (ML) ORAL 3 TIMES DAILY PRN
Qty: 240 ML | Refills: 0 | Status: SHIPPED | OUTPATIENT
Start: 2017-10-12 | End: 2018-12-04

## 2017-10-12 RX ORDER — AZITHROMYCIN 250 MG/1
TABLET, FILM COATED ORAL
Qty: 6 TAB | Refills: 0 | Status: SHIPPED | OUTPATIENT
Start: 2017-10-12 | End: 2017-10-17

## 2017-10-19 NOTE — PROGRESS NOTES
Subjective:     Chief Complaint   Patient presents with   • Cough       Ave Dave is a 57 y.o. female here today for evaluation and management of:    Bronchitis  Illness: 14 days of illness including: nasal congestion, green/purulent rhinorrhea, sore throat, laryngitis, ear pain/congestion, facial pressure, , cough ,  Sinus pain and pressure: bilateral frontal  Symptoms negative for fever, chills,   Treatments tried: none   Since onset, symptoms are worse   Similarly ill exposures: yes  Medical history negative for asthma  She  reports that she has never smoked. She has never used smokeless tobacco.         Allergies   Allergen Reactions   • Ciprofloxacin Swelling   • Nitrofurantoin Anaphylaxis     Throat swelling   • Rocephin  [Ceftriaxone] Anaphylaxis     Throat swelling/hives       Current medicines (including changes today)  Current Outpatient Prescriptions   Medication Sig Dispense Refill   • amoxicillin-clavulanate (AUGMENTIN) 875-125 MG Tab Take 1 Tab by mouth 2 times a day. 20 Tab 3   • guaifenesin-codeine (TUSSI-ORGANIDIN NR) 100-10 MG/5ML syrup Take 5 mL by mouth 3 times a day as needed for Cough. 240 mL 0   • cyclobenzaprine (FLEXERIL) 5 MG tablet Take 1-2 Tabs by mouth 3 times a day as needed. 30 Tab 0   • Cholecalciferol (D3-50) 39590 UNIT Cap Take 1 Cap by mouth every 7 days. 12 Cap 3   • citalopram (CELEXA) 20 MG Tab Take 1 Tab by mouth every day. 30 Tab 6   • trazodone (DESYREL) 50 MG Tab Take 1 Tab by mouth every bedtime. 30 Tab 6   • diclofenac EC (VOLTAREN) 75 MG Tablet Delayed Response Take 1 Tab by mouth 2 times a day as needed. 60 Tab 6   • estradiol (ESTRACE) 1 MG Tab Take 1 Tab by mouth every day. 90 Tab 3   • lovastatin (MEVACOR) 20 MG Tab Take 1 Tab by mouth every day. 90 Tab 3   • estradiol (VAGIFEM) 10 MCG Tab Insert  in vagina.     • trazodone (DESYREL) 50 MG Tab Take 50 mg by mouth.       No current facility-administered medications for this visit.        She  has a past  "medical history of B12 deficiency (3/17/2017); Dysthymia (3/15/2016); Mixed hyperlipidemia (3/17/2017); Recurrent UTI (3/17/2017); Vaginal atrophy (3/15/2016); and Vitamin D deficiency (2/29/2016).    Patient Active Problem List    Diagnosis Date Noted   • Bronchitis 10/12/2017   • Acute suppurative otitis media of both ears without spontaneous rupture of tympanic membranes 10/12/2017   • Chronic tension-type headache, not intractable 08/17/2017   • Visual changes 08/17/2017   • Mixed hyperlipidemia 03/17/2017   • B12 deficiency 03/17/2017   • Primary insomnia 03/17/2017   • Recurrent UTI 03/17/2017   • Dysthymia 03/15/2016   • Vaginal atrophy 03/15/2016   • Menopausal and perimenopausal disorder 03/15/2016   • Vitamin D deficiency 02/29/2016       ROS   No fever or chills.  No nausea or vomiting.  No chest pain or palpitations.    No pain with urination or hematuria.  No black or bloody stools.       Objective:     Blood pressure 132/86, pulse 65, temperature 36.4 °C (97.5 °F), height 1.753 m (5' 9\"), weight 95.3 kg (210 lb), SpO2 100 %. Body mass index is 31.01 kg/m².   Physical Exam:  Well developed, well nourished.  Alert, oriented in no acute distress.  Eye contact is good, speech goal directed, affect calm  Eyes: conjunctiva non-injected, sclera non-icteric.  Ears: Pinna normal. TMWith erythema bilaterally and yellow purulent material behind the drums. Loss of normal landmarks  Nose: Nares are patent. Erythematous, swollen mucosa with yellow discharge   Mouth: Oral mucous membranes pink and moist with no lesions. Mild diffuse erythema with yellow PND   Neck Supple.  No adenopathy or masses in the neck or supraclavicular regions. No thyromegaly  Lungs: clear to auscultation bilaterally with good excursion. No wheezes or rhonchi  CV: regular rate and rhythm. No murmur        Assessment and Plan:   The following treatment plan was discussed    1. Bronchitis  Zithromax as directed. Cough syrup as needed for " nighttime use. Increase fluids and rest. Patient given a prescription of Augmentin to start if symptoms worsen  - amoxicillin-clavulanate (AUGMENTIN) 875-125 MG Tab; Take 1 Tab by mouth 2 times a day.  Dispense: 20 Tab; Refill: 3  - guaifenesin-codeine (TUSSI-ORGANIDIN NR) 100-10 MG/5ML syrup; Take 5 mL by mouth 3 times a day as needed for Cough.  Dispense: 240 mL; Refill: 0  - azithromycin (ZITHROMAX) 250 MG Tab; 2 tabs by mouth day 1, 1 tab by mouth days 2-5  Dispense: 6 Tab; Refill: 0    2. Acute suppurative otitis media of both ears without spontaneous rupture of tympanic membranes, recurrence not specified  See #1    3. Need for vaccination  Flu shot given  - INFLUENZA VACCINE QUAD INJ >3Y(PF)    Any change or worsening of signs or symptoms, patient encouraged to follow-up or report to the emergency room for further evaluation. Patient understands and agrees.    Followup: Return if symptoms worsen or fail to improve.

## 2017-10-19 NOTE — ASSESSMENT & PLAN NOTE
Illness: 14 days of illness including: nasal congestion, green/purulent rhinorrhea, sore throat, laryngitis, ear pain/congestion, facial pressure, , cough ,  Sinus pain and pressure: bilateral frontal  Symptoms negative for fever, chills,   Treatments tried: none   Since onset, symptoms are worse   Similarly ill exposures: yes  Medical history negative for asthma  She  reports that she has never smoked. She has never used smokeless tobacco.

## 2017-11-03 RX ORDER — TRAZODONE HYDROCHLORIDE 50 MG/1
50 TABLET ORAL NIGHTLY PRN
Qty: 30 TAB | Refills: 11 | Status: SHIPPED | OUTPATIENT
Start: 2017-11-03 | End: 2018-11-19 | Stop reason: SDUPTHER

## 2017-11-03 RX ORDER — CITALOPRAM 20 MG/1
20 TABLET ORAL DAILY
Qty: 30 TAB | Refills: 11 | Status: SHIPPED | OUTPATIENT
Start: 2017-11-03 | End: 2018-11-08 | Stop reason: SDUPTHER

## 2017-11-28 ENCOUNTER — TELEPHONE (OUTPATIENT)
Dept: MEDICAL GROUP | Facility: MEDICAL CENTER | Age: 57
End: 2017-11-28

## 2017-11-28 NOTE — TELEPHONE ENCOUNTER
1. Caller Name: Aleks                      Call Back Number: [phone] (home)       2. Message: Patient daughter states mother is trying to schedule mammogram, but is having trouble, Patient is having right side pain and the order needs to be changed to diagnostic mammogram. Please.      3. Patient approves office to leave a detailed voicemail/MyChart message: yes

## 2017-12-28 ENCOUNTER — TELEPHONE (OUTPATIENT)
Dept: RADIOLOGY | Facility: MEDICAL CENTER | Age: 57
End: 2017-12-28

## 2017-12-28 NOTE — TELEPHONE ENCOUNTER
Spoke w/ pt to [facility] @ [facility] on 12/29 @ 8:45 check in @ 8:30, reviewed prep and location

## 2017-12-29 ENCOUNTER — HOSPITAL ENCOUNTER (OUTPATIENT)
Dept: RADIOLOGY | Facility: MEDICAL CENTER | Age: 57
End: 2017-12-29
Attending: FAMILY MEDICINE
Payer: COMMERCIAL

## 2017-12-29 DIAGNOSIS — N64.4 BREAST PAIN: ICD-10-CM

## 2017-12-29 PROCEDURE — G0279 TOMOSYNTHESIS, MAMMO: HCPCS

## 2017-12-29 PROCEDURE — 76642 ULTRASOUND BREAST LIMITED: CPT | Mod: RT

## 2018-01-03 ENCOUNTER — TELEPHONE (OUTPATIENT)
Dept: MEDICAL GROUP | Facility: MEDICAL CENTER | Age: 58
End: 2018-01-03

## 2018-01-03 NOTE — TELEPHONE ENCOUNTER
----- Message from Yandy Clark M.D. sent at 1/2/2018  4:58 PM PST -----  Please notify patient of their normal lab results.  Yandy Clark M.D.

## 2018-01-31 RX ORDER — ESTRADIOL 10 UG/1
10 INSERT VAGINAL DAILY
Qty: 8 TAB | Refills: 0 | Status: SHIPPED | OUTPATIENT
Start: 2018-01-31 | End: 2018-02-23

## 2018-02-20 ENCOUNTER — TELEPHONE (OUTPATIENT)
Dept: MEDICAL GROUP | Facility: MEDICAL CENTER | Age: 58
End: 2018-02-20

## 2018-02-21 NOTE — TELEPHONE ENCOUNTER
1. Caller Name: Ave Dave                        Call Back Number: [phone] (home)       2. Message: Patient would like to know if there is an alternative medication she can take for Vagifem that would be more cost efficient patient states she is paying $140      3. Patient approves office to leave a detailed voicemail/MyChart message: yes     .

## 2018-02-23 DIAGNOSIS — N95.9 MENOPAUSAL AND PERIMENOPAUSAL DISORDER: ICD-10-CM

## 2018-02-23 RX ORDER — ESTRADIOL 0.1 MG/G
1 CREAM VAGINAL
Qty: 1 TUBE | Refills: 6 | Status: SHIPPED | OUTPATIENT
Start: 2018-02-23 | End: 2018-12-04 | Stop reason: SDUPTHER

## 2018-05-16 RX ORDER — DICLOFENAC SODIUM 75 MG/1
75 TABLET, DELAYED RELEASE ORAL 2 TIMES DAILY PRN
Qty: 60 TAB | Refills: 0 | Status: SHIPPED | OUTPATIENT
Start: 2018-05-16 | End: 2018-09-05 | Stop reason: SDUPTHER

## 2018-08-03 ENCOUNTER — OFFICE VISIT (OUTPATIENT)
Dept: MEDICAL GROUP | Facility: MEDICAL CENTER | Age: 58
End: 2018-08-03
Payer: COMMERCIAL

## 2018-08-03 VITALS
HEIGHT: 69 IN | SYSTOLIC BLOOD PRESSURE: 146 MMHG | RESPIRATION RATE: 20 BRPM | DIASTOLIC BLOOD PRESSURE: 88 MMHG | WEIGHT: 208 LBS | HEART RATE: 60 BPM | OXYGEN SATURATION: 96 % | TEMPERATURE: 98.2 F | BODY MASS INDEX: 30.81 KG/M2

## 2018-08-03 DIAGNOSIS — R31.0 GROSS HEMATURIA: ICD-10-CM

## 2018-08-03 DIAGNOSIS — N23 RENAL COLIC ON RIGHT SIDE: ICD-10-CM

## 2018-08-03 PROCEDURE — 99214 OFFICE O/P EST MOD 30 MIN: CPT | Performed by: FAMILY MEDICINE

## 2018-08-03 RX ORDER — KETOROLAC TROMETHAMINE 10 MG/1
10 TABLET, FILM COATED ORAL EVERY 6 HOURS PRN
Qty: 20 TAB | Refills: 0 | Status: SHIPPED | OUTPATIENT
Start: 2018-08-03 | End: 2018-12-04

## 2018-08-03 ASSESSMENT — PATIENT HEALTH QUESTIONNAIRE - PHQ9: CLINICAL INTERPRETATION OF PHQ2 SCORE: 0

## 2018-08-10 ENCOUNTER — HOSPITAL ENCOUNTER (OUTPATIENT)
Dept: RADIOLOGY | Facility: MEDICAL CENTER | Age: 58
End: 2018-08-10
Attending: FAMILY MEDICINE
Payer: COMMERCIAL

## 2018-08-10 DIAGNOSIS — N23 RENAL COLIC ON RIGHT SIDE: ICD-10-CM

## 2018-08-10 DIAGNOSIS — R31.0 GROSS HEMATURIA: ICD-10-CM

## 2018-08-10 PROCEDURE — 74176 CT ABD & PELVIS W/O CONTRAST: CPT

## 2018-08-14 ENCOUNTER — TELEPHONE (OUTPATIENT)
Dept: MEDICAL GROUP | Facility: MEDICAL CENTER | Age: 58
End: 2018-08-14

## 2018-08-14 DIAGNOSIS — R10.9 FLANK PAIN: ICD-10-CM

## 2018-08-14 DIAGNOSIS — M51.36 DDD (DEGENERATIVE DISC DISEASE), LUMBAR: ICD-10-CM

## 2018-08-14 NOTE — ASSESSMENT & PLAN NOTE
Patient complains of right flank pain.  She reports that this waxes and wanes.  She gets episodes of gross hematuria the pain increases and then resolves.  She then feels sore in the area afterwards.  She denies any numbness or tingling.  No loss of strength.  No loss of bowel or bladder function.  She denies any trauma to the area.

## 2018-08-14 NOTE — TELEPHONE ENCOUNTER
----- Message from Yandy Clark M.D. sent at 8/14/2018  6:11 AM PDT -----  Please notify patient of their normal  CT results.  She does not have any kidney stones so this may be coming from her back where she has arthritis.  Does she want to consider physical therapy?  Yandy Clark M.D.

## 2018-08-14 NOTE — PROGRESS NOTES
Subjective:     Chief Complaint   Patient presents with   • UTI     x 6 days; hematuria, lower back pain, and burning with urination       Ave Dave is a 58 y.o. female here today for evaluation and management of:    Renal colic on right side  Patient complains of right flank pain.  She reports that this waxes and wanes.  She gets episodes of gross hematuria the pain increases and then resolves.  She then feels sore in the area afterwards.  She denies any numbness or tingling.  No loss of strength.  No loss of bowel or bladder function.  She denies any trauma to the area.       Allergies   Allergen Reactions   • Ciprofloxacin Swelling   • Nitrofurantoin Anaphylaxis     Throat swelling   • Rocephin  [Ceftriaxone] Anaphylaxis     Throat swelling/hives       Current medicines (including changes today)  Current Outpatient Prescriptions   Medication Sig Dispense Refill   • ketorolac (TORADOL) 10 MG Tab Take 1 Tab by mouth every 6 hours as needed for Severe Pain. Do not take with Voltaren 20 Tab 0   • estradiol (ESTRACE) 1 MG Tab TAKE ONE TABLET BY MOUTH ONCE DAILY 90 Tab 1   • diclofenac EC (VOLTAREN) 75 MG Tablet Delayed Response TAKE 1 TAB BY MOUTH 2 TIMES A DAY AS NEEDED. 60 Tab 0   • lovastatin (MEVACOR) 20 MG Tab TAKE ONE TABLET BY MOUTH ONCE DAILY 90 Tab 2   • estradiol (ESTRACE) 0.1 MG/GM vaginal cream Insert 1 g in vagina every Monday, Wednesday, and Friday. 1 Tube 6   • trazodone (DESYREL) 50 MG Tab Take 1 Tab by mouth at bedtime as needed for Sleep. 30 Tab 11   • citalopram (CELEXA) 20 MG Tab Take 1 Tab by mouth every day. 30 Tab 11   • cyclobenzaprine (FLEXERIL) 5 MG tablet Take 1-2 Tabs by mouth 3 times a day as needed. 30 Tab 0   • Cholecalciferol (D3-50) 87923 UNIT Cap Take 1 Cap by mouth every 7 days. 12 Cap 3   • trazodone (DESYREL) 50 MG Tab Take 1 Tab by mouth every bedtime. 30 Tab 6   • amoxicillin-clavulanate (AUGMENTIN) 875-125 MG Tab Take 1 Tab by mouth 2 times a day. 20 Tab 3   •  "guaifenesin-codeine (TUSSI-ORGANIDIN NR) 100-10 MG/5ML syrup Take 5 mL by mouth 3 times a day as needed for Cough. 240 mL 0     No current facility-administered medications for this visit.        She  has a past medical history of B12 deficiency (3/17/2017); Dysthymia (3/15/2016); Mixed hyperlipidemia (3/17/2017); Recurrent UTI (3/17/2017); Vaginal atrophy (3/15/2016); and Vitamin D deficiency (2/29/2016).    Patient Active Problem List    Diagnosis Date Noted   • Renal colic on right side 08/03/2018   • Gross hematuria 08/03/2018   • Bronchitis 10/12/2017   • Acute suppurative otitis media of both ears without spontaneous rupture of tympanic membranes 10/12/2017   • Chronic tension-type headache, not intractable 08/17/2017   • Visual changes 08/17/2017   • Mixed hyperlipidemia 03/17/2017   • B12 deficiency 03/17/2017   • Primary insomnia 03/17/2017   • Recurrent UTI 03/17/2017   • Dysthymia 03/15/2016   • Vaginal atrophy 03/15/2016   • Menopausal and perimenopausal disorder 03/15/2016   • Vitamin D deficiency 02/29/2016       ROS   No fever or chills.  No nausea or vomiting.  No chest pain or palpitations.  No cough or SOB.  .  No black or bloody stools.       Objective:     Blood pressure 146/88, pulse 60, temperature 36.8 °C (98.2 °F), resp. rate 20, height 1.753 m (5' 9\"), weight 94.3 kg (208 lb), SpO2 96 %. Body mass index is 30.72 kg/m².   Physical Exam:  Well developed, well nourished.  Alert, oriented in no acute distress.  Eye contact is good, speech goal directed, affect calm  Eyes: conjunctiva non-injected, sclera non-icteric.  Neck Supple.  No adenopathy or masses in the neck or supraclavicular regions. No thyromegaly  Lungs: clear to auscultation bilaterally with good excursion. No wheezes or rhonchi  CV: regular rate and rhythm. No murmur  Abdomen: soft, nontender, no masses or organomegaly.  No rebound or guarding.  Right CVA tenderness present normal urinalysis.            Assessment and Plan:   The " following treatment plan was discussed    1. Renal colic on right side  CT to assess etiology.  Oral Toradol prescription given.  Consider urology referral  - CT-RENAL COLIC EVALUATION(A/P W/O); Future  - ketorolac (TORADOL) 10 MG Tab; Take 1 Tab by mouth every 6 hours as needed for Severe Pain. Do not take with Voltaren  Dispense: 20 Tab; Refill: 0    2. Gross hematuria  See #1  - CT-RENAL COLIC EVALUATION(A/P W/O); Future    Any change or worsening of signs or symptoms, patient encouraged to follow-up or report to the emergency room for further evaluation. Patient understands and agrees.    Followup: Return if symptoms worsen or fail to improve.

## 2018-08-15 NOTE — TELEPHONE ENCOUNTER
VOICEMAIL  1. Caller Name: Ave Dave                      Call Back Number: [phone] (home)     2. Message: Pt's  is calling wondering about her arthritis, he wants to know how you know she has arthritis and a bit more about the dx. Please advise.     3. Patient approves office to leave a detailed voicemail/MyChart message: N\A

## 2018-11-07 DIAGNOSIS — J40 BRONCHITIS: ICD-10-CM

## 2018-11-07 RX ORDER — AMOXICILLIN AND CLAVULANATE POTASSIUM 875; 125 MG/1; MG/1
1 TABLET, FILM COATED ORAL 2 TIMES DAILY
Qty: 20 TAB | Refills: 3 | Status: SHIPPED | OUTPATIENT
Start: 2018-11-07 | End: 2019-10-16 | Stop reason: SDUPTHER

## 2018-11-08 RX ORDER — CITALOPRAM 20 MG/1
20 TABLET ORAL DAILY
Qty: 30 TAB | Refills: 8 | Status: SHIPPED | OUTPATIENT
Start: 2018-11-08 | End: 2019-08-02 | Stop reason: SDUPTHER

## 2018-11-20 RX ORDER — TRAZODONE HYDROCHLORIDE 50 MG/1
TABLET ORAL
Qty: 30 TAB | Refills: 3 | Status: SHIPPED | OUTPATIENT
Start: 2018-11-20 | End: 2018-12-04

## 2018-12-04 ENCOUNTER — OFFICE VISIT (OUTPATIENT)
Dept: MEDICAL GROUP | Facility: LAB | Age: 58
End: 2018-12-04
Payer: COMMERCIAL

## 2018-12-04 VITALS
DIASTOLIC BLOOD PRESSURE: 76 MMHG | SYSTOLIC BLOOD PRESSURE: 132 MMHG | WEIGHT: 213 LBS | OXYGEN SATURATION: 97 % | BODY MASS INDEX: 31.55 KG/M2 | HEIGHT: 69 IN | HEART RATE: 57 BPM | TEMPERATURE: 97.8 F | RESPIRATION RATE: 16 BRPM

## 2018-12-04 DIAGNOSIS — N95.9 MENOPAUSAL AND PERIMENOPAUSAL DISORDER: ICD-10-CM

## 2018-12-04 DIAGNOSIS — N39.0 RECURRENT UTI: ICD-10-CM

## 2018-12-04 DIAGNOSIS — Z12.31 ENCOUNTER FOR SCREENING MAMMOGRAM FOR BREAST CANCER: ICD-10-CM

## 2018-12-04 DIAGNOSIS — E55.9 VITAMIN D DEFICIENCY: ICD-10-CM

## 2018-12-04 DIAGNOSIS — Z13.1 SCREENING FOR DIABETES MELLITUS: ICD-10-CM

## 2018-12-04 DIAGNOSIS — E78.2 MIXED HYPERLIPIDEMIA: ICD-10-CM

## 2018-12-04 DIAGNOSIS — N95.2 VAGINAL ATROPHY: ICD-10-CM

## 2018-12-04 DIAGNOSIS — Z13.29 SCREENING FOR THYROID DISORDER: ICD-10-CM

## 2018-12-04 DIAGNOSIS — F51.01 PRIMARY INSOMNIA: ICD-10-CM

## 2018-12-04 PROBLEM — R31.0 GROSS HEMATURIA: Status: RESOLVED | Noted: 2018-08-03 | Resolved: 2018-12-04

## 2018-12-04 PROBLEM — J40 BRONCHITIS: Status: RESOLVED | Noted: 2017-10-12 | Resolved: 2018-12-04

## 2018-12-04 PROBLEM — H53.9 VISUAL CHANGES: Status: RESOLVED | Noted: 2017-08-17 | Resolved: 2018-12-04

## 2018-12-04 PROBLEM — H66.003 ACUTE SUPPURATIVE OTITIS MEDIA OF BOTH EARS WITHOUT SPONTANEOUS RUPTURE OF TYMPANIC MEMBRANES: Status: RESOLVED | Noted: 2017-10-12 | Resolved: 2018-12-04

## 2018-12-04 PROCEDURE — 99214 OFFICE O/P EST MOD 30 MIN: CPT | Performed by: FAMILY MEDICINE

## 2018-12-04 RX ORDER — ESTRADIOL 0.1 MG/G
1 CREAM VAGINAL
Qty: 1 TUBE | Refills: 12 | Status: SHIPPED | OUTPATIENT
Start: 2018-12-05 | End: 2021-10-05

## 2018-12-04 ASSESSMENT — PATIENT HEALTH QUESTIONNAIRE - PHQ9
8. MOVING OR SPEAKING SO SLOWLY THAT OTHER PEOPLE COULD HAVE NOTICED. OR THE OPPOSITE, BEING SO FIGETY OR RESTLESS THAT YOU HAVE BEEN MOVING AROUND A LOT MORE THAN USUAL: NOT AT ALL
SUM OF ALL RESPONSES TO PHQ9 QUESTIONS 1 AND 2: 0
5. POOR APPETITE OR OVEREATING: NOT AT ALL
6. FEELING BAD ABOUT YOURSELF - OR THAT YOU ARE A FAILURE OR HAVE LET YOURSELF OR YOUR FAMILY DOWN: NOT AL ALL
4. FEELING TIRED OR HAVING LITTLE ENERGY: NOT AT ALL
3. TROUBLE FALLING OR STAYING ASLEEP OR SLEEPING TOO MUCH: NOT AT ALL
9. THOUGHTS THAT YOU WOULD BE BETTER OFF DEAD, OR OF HURTING YOURSELF: NOT AT ALL
2. FEELING DOWN, DEPRESSED, IRRITABLE, OR HOPELESS: NOT AT ALL
7. TROUBLE CONCENTRATING ON THINGS, SUCH AS READING THE NEWSPAPER OR WATCHING TELEVISION: NOT AT ALL
1. LITTLE INTEREST OR PLEASURE IN DOING THINGS: NOT AT ALL
SUM OF ALL RESPONSES TO PHQ QUESTIONS 1-9: 0

## 2018-12-05 NOTE — PATIENT INSTRUCTIONS
Estradiol vaginal cream  ¿Qué es shakila medicamento?  El ESTRADIOL contiene carter hormona estrogénica femenina. Se utiliza para los síntomas de la menopausia, saeid irritación y sequedad vaginal.  Shakila medicamento puede ser utilizado para otros usos; si tiene alguna pregunta consulte con baker proveedor de atención médica o con baker farmacéutico.  MARCAS COMUNES: Estrace  ¿Qué le nancy informar a mi profesional de la daniella antes de irvin shakila medicamento?  Necesita saber si usted presenta alguno de los siguientes problemas o situaciones:  -sangrado vaginal anormal  -enfermedad vascular o coágulos sanguíneos  -cáncer de mama, cervical, endometrio, ovario, hígado o uterino  -demencia  -diabetes  -enfermedad de la vesícula biliar  -enfermedad cardiaca o ataque cardiaco reciente  -kathryn presión sanguínea  -niveles elevados de colesterol  -nivel elevado de calcio en la brian  -histerectomía  -enfermedad renal  -enfermedad hepática  -migrañas  -deficiencia de proteína C  -deficiencia de proteína S  -derrame cerebral  -lupus eritematoso sistémico (LES)  -fuma tabaco  -carter reacción alérgica o inusual a los estrógenos, otras hormonas, soya, otros medicamentos, alimentos, colorantes o conservantes  -si está embarazada o buscando quedar embarazada  -si está amamantando a un bebé  ¿Cómo nancy utilizar shakila medicamento?  Shakila medicamento se utiliza solamente en la vagina. No lo ingiera por vía oral. Siga las instrucciones de la etiqueta del medicamento. Amina las instrucciones del envase antes de usarlo. Utilice el aplicador especial suministrado con la crema. Lávese las sophia antes y después de usar. Llene el aplicador con la cantidad de crema recetada. Recuéstese de espaldas, separe y flexione las rodillas. Introduzca el aplicador en la vagina y empuje el émbolo para llevar la crema dentro de la vagina. Lave el aplicador con [place] y jabón, y enjuáguelo yahaira. Utilícelo exactamente según se indica y tab todo el tiempo que fue  recetada. No interrumpa baker uso excepto si así lo indica baker médico o baker profesional de la daniella.  Usted recibirá un prospecto para el paciente para jennifer producto con cada receta y relleno. Asegúrese de leer jennifer folleto cada vez cuidadosamente. Jennifer folleto puede cambiar con frecuencia.  Hable con baker pediatra para informarse acerca del uso de jennifer medicamento en niños. Jennifer medicamento no está aprobado para uso en niños.  Sobredosis: Póngase en contacto inmediatamente con un centro toxicológico o carter earnest de urgencia si usted marcy que haya tomado demasiado medicamento.  ATENCIÓN: Jennifer medicamento es solo para usted. No comparta jennifer medicamento con nadie.  ¿Qué sucede si me olvido de carter dosis?  Si olvida carter dosis, aplíquela lo antes posible. Si es donna la hora de la próxima dosis, aplique sólo lizabeth dosis. No use dosis adicionales o dobles.  ¿Qué puede interactuar con jennifer medicamento?  No tome esta medicina con ninguno de los siguientes medicamentos:  -inhibidores de la aromatasa, tales saeid aminoglutetimida, anastrozol, exemestáno, letrozol, testolactona  Esta medicina también puede interactuar con los siguientes medicamentos:  -barbitúricos para inducir el sueño o para el tratamiento de convulsiones  -carbamazepina  -jugo de toronja  -medicamentos para infecciones micóticas, tales saeid itraconazol y quetoconazol  -raloxifeno  -rifabutina  -rifampicina  -rifapentina  -ritonavir  -hierba de Jen  -tamoxifeno  -warfarina  Puede ser que esta lista no menciona todas las posibles interacciones. Informe a baker profesional de la daniella de todos los productos a base de hierbas, medicamentos de venta marie o suplementos nutritivos que esté tomando. Si usted fuma, consume bebidas alcohólicas o si utiliza drogas ilegales, indíqueselo también a bkaer profesional de la daniella. Algunas sustancias pueden interactuar con baker medicamento.  ¿A qué nancy estar atento al usar jennifer medicamento?  Visite a baker médico o a baker profesional de la  daniella para chequear baker evolución periódicamente. Debe hacerse exámenes de las mamas y la pelvis en forma regular. También debe discutir con baker profesional de la daniella la necesidad de hacerse mamografías periódicamente y seguir las pautas que jennifer profesional establezca para estas pruebas.  Jennifer medicamento puede hacer que baker cuerpo retenga líquido, lo que puede provocar que se le hinchen los dedos, sophia o tobillos. Baker presión sanguínea puede aumentar. Comuníquese con baker médico o con baker profesional de la daniella si siente que está reteniendo líquido.  Si tiene algún motivo para pensar que está embarazada, deje de irvin jennifer medicamento de inmediato y comuníquese con baker médico o con baker profesional de la daniella.  El fumar tabaco aumenta el riesgo de formación de coágulos o de sufrir un derrame cerebral, especialmente si tiene más de 35 años de edad. Se le recomienda enfáticamente que no fume.  Si usa lentes de contacto y observa cambios en la visión, o si los lentes comienzan a resultarle incómodos, consulte con baker médico de los ojos.  Si va a someterse a carter operación electiva, ilene vez deba dejar de usar jennifer medicamento de antemano. Consulte con baker profesional de la daniella antes de programar la operación.  ¿Qué efectos secundarios puedo tener al utilizar jennifer medicamento?  Efectos secundarios que debe informar a baker médico o a baker profesional de la daniella tan pronto saeid sea posible:  -reacciones alérgicas saeid erupción cutánea, picazón o urticarias, hinchazón de la zo, labios o lengua  -secreciones o cambios en el tejido de las mamas  -cambios en la visión  -dolor en el pecho  -confusión, dificultad para hablar o entender  -orina de color amarillo oscuro  -sensación general de estar enfermo o síntomas gripales  -heces claras  -náuseas o vómito  -dolor, hinchazón, sensación cálida en las piernas  -dolor en la región abdominal superior derecha  -arslan de anoop severos  -falta de aliento repentina  -debilidad o  entumecimiento repentino de la zo, brazos o piernas  -problemas para caminar, mareos, pérdida de la coordinación o equilibrio  -sangrado vaginal inusual  -color amarillento de los ojos o la piel  Efectos secundarios que, por lo general, no requieren atención médica (debe informarlos a baker médico o a baker profesional de la daniella si persisten o si son molestos):  -caída del bennett  -aumento de apetito o sed  -aumento de descargas de orina  -síntomas de carter infección vaginal, saeid picazón, irritación o flujo inusual  -cansancio o debilidad inusual  Puede ser que esta lista no menciona todos los posibles efectos secundarios. Comuníquese a baker médico por asesoramiento médico sobre los efectos secundarios. Usted puede informar los efectos secundarios a la FDA por teléfono al [phone].  ¿Dónde nancy guardar mi medicina?  Manténgala fuera del alcance de los niños.  Guárdela a temperatura ambiente, entre 15 y 30 grados C (59 y 86 grados F). Protéjala de las temperaturas superiores de 40 grados C (104 grados C). No la congele. Deseche todo el medicamento que no haya utilizado, después de la fecha de vencimiento.  ATENCIÓN: Jennifer folleto es un resumen. Puede ser que no cubra toda la posible información. Si usted tiene preguntas acerca de esta medicina, consulte con baker médico, baker farmacéutico o baker profesional de la daniella.  © 2018 [employer] (2016-02-09 00:00:00)

## 2018-12-07 LAB
25(OH)D3+25(OH)D2 SERPL-MCNC: 35.1 NG/ML (ref 30–100)
ALBUMIN SERPL-MCNC: 4.2 G/DL (ref 3.5–5.5)
ALBUMIN/GLOB SERPL: 1.7 {RATIO} (ref 1.2–2.2)
ALP SERPL-CCNC: 85 IU/L (ref 39–117)
ALT SERPL-CCNC: 20 IU/L (ref 0–32)
AST SERPL-CCNC: 19 IU/L (ref 0–40)
BILIRUB SERPL-MCNC: 0.4 MG/DL (ref 0–1.2)
BUN SERPL-MCNC: 12 MG/DL (ref 6–24)
BUN/CREAT SERPL: 18 (ref 9–23)
CALCIUM SERPL-MCNC: 9.2 MG/DL (ref 8.7–10.2)
CHLORIDE SERPL-SCNC: 107 MMOL/L (ref 96–106)
CHOLEST SERPL-MCNC: 177 MG/DL (ref 100–199)
CO2 SERPL-SCNC: 22 MMOL/L (ref 20–29)
CREAT SERPL-MCNC: 0.68 MG/DL (ref 0.57–1)
GLOBULIN SER CALC-MCNC: 2.5 G/DL (ref 1.5–4.5)
GLUCOSE SERPL-MCNC: 86 MG/DL (ref 65–99)
HDLC SERPL-MCNC: 75 MG/DL
IF AFRICAN AMERICAN  100797: 112 ML/MIN/1.73
IF NON AFRICAN AMER 100791: 97 ML/MIN/1.73
LABORATORY COMMENT REPORT: NORMAL
LDLC SERPL CALC-MCNC: 81 MG/DL (ref 0–99)
POTASSIUM SERPL-SCNC: 4 MMOL/L (ref 3.5–5.2)
PROT SERPL-MCNC: 6.7 G/DL (ref 6–8.5)
SODIUM SERPL-SCNC: 140 MMOL/L (ref 134–144)
TRIGL SERPL-MCNC: 103 MG/DL (ref 0–149)
TSH SERPL DL<=0.005 MIU/L-ACNC: 2.42 UIU/ML (ref 0.45–4.5)
VLDLC SERPL CALC-MCNC: 21 MG/DL (ref 5–40)

## 2018-12-11 NOTE — ASSESSMENT & PLAN NOTE
Patient continues to have severe hot flashes if she forgets to take her estradiol.  The vaginal estrogen cream helps with the vaginal dryness and  Pain with intercourse.

## 2018-12-11 NOTE — ASSESSMENT & PLAN NOTE
Dyslipidemia Chronic condition. Current treatments: diet/exercise/medicines. She is taking medicine lovastatin 20 mg as directed. Current side effects: none.

## 2018-12-11 NOTE — PROGRESS NOTES
Subjective:     Chief Complaint   Patient presents with   • Medication Refill       Ave Dave is a 58 y.o. female here today for evaluation and management of:    Menopausal and perimenopausal disorder  Patient continues to have severe hot flashes if she forgets to take her estradiol.  The vaginal estrogen cream helps with the vaginal dryness and  Pain with intercourse.    Mixed hyperlipidemia  Dyslipidemia Chronic condition. Current treatments: diet/exercise/medicines. She is taking medicine lovastatin 20 mg as directed. Current side effects: none.    Primary insomnia  Patient reports that the trazodone works well for her insomnia.      Recurrent UTI  Patient denies any current symptoms.       Allergies   Allergen Reactions   • Ciprofloxacin Swelling   • Nitrofurantoin Anaphylaxis     Throat swelling   • Rocephin  [Ceftriaxone] Anaphylaxis     Throat swelling/hives       Current medicines (including changes today)  Current Outpatient Prescriptions   Medication Sig Dispense Refill   • estradiol (ESTRACE) 0.1 MG/GM vaginal cream Insert 1 g in vagina every Monday, Wednesday, and Friday. 1 Tube 12   • citalopram (CELEXA) 20 MG Tab Take 1 Tab by mouth every day. 30 Tab 8   • diclofenac EC (VOLTAREN) 75 MG Tablet Delayed Response TAKE ONE TABLET BY MOUTH TWICE A DAY AS NEEDED 60 Tab 3   • estradiol (ESTRACE) 1 MG Tab TAKE ONE TABLET BY MOUTH ONCE DAILY 90 Tab 1   • lovastatin (MEVACOR) 20 MG Tab TAKE ONE TABLET BY MOUTH ONCE DAILY 90 Tab 2   • Cholecalciferol (D3-50) 30075 UNIT Cap Take 1 Cap by mouth every 7 days. 12 Cap 3   • trazodone (DESYREL) 50 MG Tab Take 1 Tab by mouth every bedtime. 30 Tab 6   • amoxicillin-clavulanate (AUGMENTIN) 875-125 MG Tab Take 1 Tab by mouth 2 times a day. 20 Tab 3     No current facility-administered medications for this visit.        She  has a past medical history of B12 deficiency (3/17/2017); Dysthymia (3/15/2016); Mixed hyperlipidemia (3/17/2017); Recurrent UTI  "(3/17/2017); Vaginal atrophy (3/15/2016); and Vitamin D deficiency (2/29/2016).    Patient Active Problem List    Diagnosis Date Noted   • Renal colic on right side 08/03/2018   • Chronic tension-type headache, not intractable 08/17/2017   • Mixed hyperlipidemia 03/17/2017   • B12 deficiency 03/17/2017   • Primary insomnia 03/17/2017   • Recurrent UTI 03/17/2017   • Dysthymia 03/15/2016   • Vaginal atrophy 03/15/2016   • Menopausal and perimenopausal disorder 03/15/2016   • Vitamin D deficiency 02/29/2016       ROS   No fever or chills.  No nausea or vomiting.  No chest pain or palpitations.  No cough or SOB.  No pain with urination or hematuria.  No black or bloody stools.       Objective:     Blood pressure 132/76, pulse (!) 57, temperature 36.6 °C (97.8 °F), temperature source Temporal, resp. rate 16, height 1.753 m (5' 9\"), weight 96.6 kg (213 lb), SpO2 97 %. Body mass index is 31.45 kg/m².   Physical Exam:  Well developed, well nourished.  Alert, oriented in no acute distress.  Eye contact is good, speech goal directed, affect calm  Eyes: conjunctiva non-injected, sclera non-icteric.  Neck Supple.  No adenopathy or masses in the neck or supraclavicular regions. No thyromegaly  Lungs: clear to auscultation bilaterally with good excursion. No wheezes or rhonchi  CV: regular rate and rhythm. No murmur  Abdomen: soft, nontender, no masses or organomegaly.  No rebound or guarding  Ext: no edema, color normal, vascularity normal, temperature normal          Assessment and Plan:   The following treatment plan was discussed    1. Mixed hyperlipidemia  Check lipid panel and adjust lovastatin as needed  - Lipid Profile; Future    2. Menopausal and perimenopausal disorder  Continue vaginal estradiol  - estradiol (ESTRACE) 0.1 MG/GM vaginal cream; Insert 1 g in vagina every Monday, Wednesday, and Friday.  Dispense: 1 Tube; Refill: 12    3. Recurrent UTI  Continue vaginal estrogen    4. Vaginal atrophy  Continue vaginal " estrogen  - estradiol (ESTRACE) 0.1 MG/GM vaginal cream; Insert 1 g in vagina every Monday, Wednesday, and Friday.  Dispense: 1 Tube; Refill: 12    5. Vitamin D deficiency  Check vitamin D level and adjust supplementation as needed  - VITAMIN D,25 HYDROXY; Future    6. Screening for thyroid disorder  Screening labs ordered.  Await results for counseling.  - TSH; Future    7. Screening for diabetes mellitus  Screening labs ordered.  Await results for counseling.  - COMP METABOLIC PANEL; Future    8. Encounter for screening mammogram for breast cancer    - MA-SCREEN MAMMO W/CAD-BILAT; Future    9. Primary insomnia  Continue trazadone    Any change or worsening of signs or symptoms, patient encouraged to follow-up or report to the emergency room for further evaluation. Patient understands and agrees.    Followup: Return in about 1 year (around 12/4/2019).

## 2018-12-19 RX ORDER — CHOLECALCIFEROL (VITAMIN D3) 1250 MCG
CAPSULE ORAL
Qty: 12 CAP | Refills: 3 | Status: SHIPPED | OUTPATIENT
Start: 2018-12-19 | End: 2020-04-16

## 2019-02-19 NOTE — TELEPHONE ENCOUNTER
Was the patient seen in the last year in this department? Yes LOV: 12/4/18    Does patient have an active prescription for medications requested? No     Received Request Via: Pharmacy

## 2019-02-20 ENCOUNTER — APPOINTMENT (OUTPATIENT)
Dept: RADIOLOGY | Facility: MEDICAL CENTER | Age: 59
End: 2019-02-20
Attending: FAMILY MEDICINE
Payer: COMMERCIAL

## 2019-02-20 RX ORDER — ESTRADIOL 1 MG/1
TABLET ORAL
Qty: 90 TAB | Refills: 2 | Status: SHIPPED | OUTPATIENT
Start: 2019-02-20 | End: 2019-10-17 | Stop reason: SDUPTHER

## 2019-06-18 ENCOUNTER — TELEPHONE (OUTPATIENT)
Dept: MEDICAL GROUP | Facility: LAB | Age: 59
End: 2019-06-18

## 2019-06-18 ENCOUNTER — HOSPITAL ENCOUNTER (OUTPATIENT)
Dept: RADIOLOGY | Facility: MEDICAL CENTER | Age: 59
End: 2019-06-18
Attending: FAMILY MEDICINE
Payer: COMMERCIAL

## 2019-06-18 DIAGNOSIS — Z12.31 ENCOUNTER FOR SCREENING MAMMOGRAM FOR BREAST CANCER: ICD-10-CM

## 2019-06-18 PROCEDURE — 77063 BREAST TOMOSYNTHESIS BI: CPT

## 2019-06-18 NOTE — TELEPHONE ENCOUNTER
----- Message from Yandy Clark M.D. sent at 6/18/2019  3:01 PM PDT -----  Please notify patient of their normal mammogram results.  This should be repeated in 1 year.  Yandy Clark M.D.

## 2019-10-01 RX ORDER — TRAZODONE HYDROCHLORIDE 50 MG/1
50 TABLET ORAL NIGHTLY PRN
Qty: 30 TAB | Refills: 2 | Status: SHIPPED | OUTPATIENT
Start: 2019-10-01 | End: 2019-10-17 | Stop reason: SDUPTHER

## 2019-10-01 NOTE — TELEPHONE ENCOUNTER
Was the patient seen in the last year in this department? Yes 12/2/18  NEXT APPT 10/17/19  Does patient have an active prescription for medications requested? No     Received Request Via: Pharmacy

## 2019-10-16 DIAGNOSIS — J40 BRONCHITIS: ICD-10-CM

## 2019-10-16 RX ORDER — AMOXICILLIN AND CLAVULANATE POTASSIUM 875; 125 MG/1; MG/1
TABLET, FILM COATED ORAL
Qty: 20 TAB | Refills: 3 | Status: SHIPPED | OUTPATIENT
Start: 2019-10-16 | End: 2020-10-13

## 2019-10-16 NOTE — TELEPHONE ENCOUNTER
Was the patient seen in the last year in this department? Yes  12/4/18  Does patient have an active prescription for medications requested? No     Received Request Via: Pharmacy

## 2019-10-17 ENCOUNTER — OFFICE VISIT (OUTPATIENT)
Dept: MEDICAL GROUP | Facility: LAB | Age: 59
End: 2019-10-17
Payer: COMMERCIAL

## 2019-10-17 VITALS
OXYGEN SATURATION: 99 % | HEIGHT: 65 IN | WEIGHT: 195 LBS | SYSTOLIC BLOOD PRESSURE: 124 MMHG | BODY MASS INDEX: 32.49 KG/M2 | DIASTOLIC BLOOD PRESSURE: 76 MMHG | HEART RATE: 60 BPM | RESPIRATION RATE: 16 BRPM | TEMPERATURE: 98.2 F

## 2019-10-17 DIAGNOSIS — N39.0 RECURRENT UTI: ICD-10-CM

## 2019-10-17 DIAGNOSIS — Z11.59 NEED FOR HEPATITIS C SCREENING TEST: ICD-10-CM

## 2019-10-17 DIAGNOSIS — Z23 NEED FOR VACCINATION: ICD-10-CM

## 2019-10-17 DIAGNOSIS — E78.2 MIXED HYPERLIPIDEMIA: ICD-10-CM

## 2019-10-17 DIAGNOSIS — Z13.29 SCREENING FOR THYROID DISORDER: ICD-10-CM

## 2019-10-17 DIAGNOSIS — E53.8 B12 DEFICIENCY: ICD-10-CM

## 2019-10-17 DIAGNOSIS — E55.9 VITAMIN D DEFICIENCY: ICD-10-CM

## 2019-10-17 DIAGNOSIS — F51.01 PRIMARY INSOMNIA: ICD-10-CM

## 2019-10-17 DIAGNOSIS — N95.9 MENOPAUSAL AND PERIMENOPAUSAL DISORDER: ICD-10-CM

## 2019-10-17 DIAGNOSIS — Z13.1 SCREENING FOR DIABETES MELLITUS: ICD-10-CM

## 2019-10-17 DIAGNOSIS — F34.1 DYSTHYMIA: ICD-10-CM

## 2019-10-17 PROCEDURE — 99214 OFFICE O/P EST MOD 30 MIN: CPT | Mod: 25 | Performed by: FAMILY MEDICINE

## 2019-10-17 PROCEDURE — 90471 IMMUNIZATION ADMIN: CPT | Performed by: FAMILY MEDICINE

## 2019-10-17 PROCEDURE — 90686 IIV4 VACC NO PRSV 0.5 ML IM: CPT | Performed by: FAMILY MEDICINE

## 2019-10-17 RX ORDER — CITALOPRAM 20 MG/1
TABLET ORAL
Qty: 90 TAB | Refills: 3 | Status: SHIPPED
Start: 2019-10-17 | End: 2020-01-15 | Stop reason: SDUPTHER

## 2019-10-17 RX ORDER — CYANOCOBALAMIN 1000 UG/ML
1000 INJECTION, SOLUTION INTRAMUSCULAR; SUBCUTANEOUS ONCE
Status: COMPLETED | OUTPATIENT
Start: 2019-10-17 | End: 2019-10-17

## 2019-10-17 RX ORDER — ESTRADIOL 1 MG/1
TABLET ORAL
Qty: 90 TAB | Refills: 3 | Status: SHIPPED | OUTPATIENT
Start: 2019-10-17 | End: 2020-11-03 | Stop reason: SDUPTHER

## 2019-10-17 RX ORDER — TRAZODONE HYDROCHLORIDE 50 MG/1
50 TABLET ORAL NIGHTLY PRN
Qty: 90 TAB | Refills: 3 | Status: SHIPPED
Start: 2019-10-17 | End: 2020-01-15 | Stop reason: SDUPTHER

## 2019-10-17 RX ORDER — LOVASTATIN 20 MG/1
20 TABLET ORAL DAILY
Qty: 90 TAB | Refills: 3 | Status: SHIPPED | OUTPATIENT
Start: 2019-10-17 | End: 2020-11-03 | Stop reason: SDUPTHER

## 2019-10-17 RX ADMIN — CYANOCOBALAMIN 1000 MCG: 1000 INJECTION, SOLUTION INTRAMUSCULAR; SUBCUTANEOUS at 11:36

## 2019-10-24 ASSESSMENT — PATIENT HEALTH QUESTIONNAIRE - PHQ9: CLINICAL INTERPRETATION OF PHQ2 SCORE: 0

## 2019-10-24 NOTE — ASSESSMENT & PLAN NOTE
Patient is doing well on the estradiol 1 mg daily.  If she tries to wean it she gets severe hot flash and severe vaginal dryness.  She denies any history of blood clots.  No family history of breast cancer.

## 2019-10-24 NOTE — ASSESSMENT & PLAN NOTE
Stable. Currently taking citalopram 20 mg daily as prescribed.   Denies side effects and is tolerating well.  Mood is improved with current medication and therapy.    Patient denies SI/HI.  Depression Screen (PHQ-2/PHQ-9) 8/3/2018 12/4/2018 10/17/2019   PHQ-2 Total Score - 0 -   PHQ-2 Total Score 0 - 0   PHQ-9 Total Score - 0 -

## 2019-10-24 NOTE — ASSESSMENT & PLAN NOTE
Dyslipidemia Chronic condition. Current treatments: diet/exercise/medicines. She is taking lovastatin 20 mg as directed. Current side effects: none.

## 2019-10-25 NOTE — PROGRESS NOTES
Subjective:     Chief Complaint   Patient presents with   • Other     refills       Ave Dave is a 59 y.o. female here today for evaluation and management of:    Dysthymia  Stable. Currently taking citalopram 20 mg daily as prescribed.   Denies side effects and is tolerating well.  Mood is improved with current medication and therapy.    Patient denies SI/HI.  Depression Screen (PHQ-2/PHQ-9) 8/3/2018 12/4/2018 10/17/2019   PHQ-2 Total Score - 0 -   PHQ-2 Total Score 0 - 0   PHQ-9 Total Score - 0 -           Menopausal and perimenopausal disorder  Patient is doing well on the estradiol 1 mg daily.  If she tries to wean it she gets severe hot flash and severe vaginal dryness.  She denies any history of blood clots.  No family history of breast cancer.    Mixed hyperlipidemia  Dyslipidemia Chronic condition. Current treatments: diet/exercise/medicines. She is taking lovastatin 20 mg as directed. Current side effects: none.       Primary insomnia  Patient is doing well on trazodone 50 mg nightly.       Allergies   Allergen Reactions   • Ciprofloxacin Swelling   • Nitrofurantoin Anaphylaxis     Throat swelling   • Rocephin  [Ceftriaxone] Anaphylaxis     Throat swelling/hives       Current medicines (including changes today)  Current Outpatient Medications   Medication Sig Dispense Refill   • estradiol (ESTRACE) 1 MG Tab TAKE 1 TABLET BY MOUTH ONCE DAILY 90 Tab 3   • lovastatin (MEVACOR) 20 MG Tab Take 1 Tab by mouth every day. 90 Tab 3   • citalopram (CELEXA) 20 MG Tab TAKE ONE TABLET BY MOUTH DAILY 90 Tab 3   • traZODone (DESYREL) 50 MG Tab Take 1 Tab by mouth at bedtime as needed. FOR SLEEP 90 Tab 3   • amoxicillin-clavulanate (AUGMENTIN) 875-125 MG Tab TAKE ONE TABLET BY MOUTH TWICE A DAY 20 Tab 3   • Cholecalciferol (VITAMIN D3) 85402 units Cap TAKE ONE CAPSULE BY MOUTH EVERY 7 DAYS 12 Cap 3   • estradiol (ESTRACE) 0.1 MG/GM vaginal cream Insert 1 g in vagina every Monday, Wednesday, and Friday. 1 Tube 12  "  • diclofenac EC (VOLTAREN) 75 MG Tablet Delayed Response TAKE ONE TABLET BY MOUTH TWICE A DAY AS NEEDED 60 Tab 3     No current facility-administered medications for this visit.        She  has a past medical history of B12 deficiency (3/17/2017), Dysthymia (3/15/2016), Mixed hyperlipidemia (3/17/2017), Recurrent UTI (3/17/2017), Vaginal atrophy (3/15/2016), and Vitamin D deficiency (2/29/2016).    Patient Active Problem List    Diagnosis Date Noted   • Renal colic on right side 08/03/2018   • Chronic tension-type headache, not intractable 08/17/2017   • Mixed hyperlipidemia 03/17/2017   • B12 deficiency 03/17/2017   • Primary insomnia 03/17/2017   • Recurrent UTI 03/17/2017   • Dysthymia 03/15/2016   • Vaginal atrophy 03/15/2016   • Menopausal and perimenopausal disorder 03/15/2016   • Vitamin D deficiency 02/29/2016       ROS   No fever or chills.  No nausea or vomiting.  No chest pain or palpitations.  No cough or SOB.  No pain with urination or hematuria.  No black or bloody stools.       Objective:     /76 (BP Location: Right arm, Patient Position: Sitting, BP Cuff Size: Adult)   Pulse 60   Temp 36.8 °C (98.2 °F) (Temporal)   Resp 16   Ht 1.651 m (5' 5\")   Wt 88.5 kg (195 lb)   SpO2 99%  Body mass index is 32.45 kg/m².   Physical Exam:  Well developed, well nourished.  Alert, oriented in no acute distress.  Eye contact is good, speech goal directed, affect calm  Eyes: conjunctiva non-injected, sclera non-icteric.  Neck Supple.  No adenopathy or masses in the neck or supraclavicular regions. No thyromegaly  Lungs: clear to auscultation bilaterally with good excursion. No wheezes or rhonchi  CV: regular rate and rhythm. No murmur  Abdomen: soft, nontender, no masses or organomegaly.  No rebound or guarding  Ext: no edema, color normal, vascularity normal, temperature normal          Assessment and Plan:   The following treatment plan was discussed    1. Need for vaccination    - Influenza Vaccine " Quad Injection (PF)    2. B12 deficiency  B12 given.  Check labs.  - CBC WITH DIFFERENTIAL; Future  - VITAMIN B12; Future  - cyanocobalamin (VITAMIN B-12) injection 1,000 mcg    3. Dysthymia  This is a chronic medical condition that is currently stable  Citalopram 20 mg daily  - citalopram (CELEXA) 20 MG Tab; TAKE ONE TABLET BY MOUTH DAILY  Dispense: 90 Tab; Refill: 3    4. Menopausal and perimenopausal disorder  Discussed risk and benefits.  Continue with Estrace 1 mg daily  - estradiol (ESTRACE) 1 MG Tab; TAKE 1 TABLET BY MOUTH ONCE DAILY  Dispense: 90 Tab; Refill: 3    5. Mixed hyperlipidemia  This is a chronic medical condition that is currently stable  Check labs and adjust lovastatin dose as needed  - Lipid Profile; Future  - TSH; Future  - lovastatin (MEVACOR) 20 MG Tab; Take 1 Tab by mouth every day.  Dispense: 90 Tab; Refill: 3    6. Recurrent UTI  Continue Augmentin after sexual intercourse    7. Vitamin D deficiency  Check vitamin D and adjust supplementation as needed  - VITAMIN D,25 HYDROXY; Future    8. Screening for diabetes mellitus  Screening labs ordered.  Await results for counseling.  - Comp Metabolic Panel; Future    9. Screening for thyroid disorder  Screening labs ordered.  Await results for counseling.  - VITAMIN D,25 HYDROXY; Future    10. Need for hepatitis C screening test  Screening labs ordered.  Await results for counseling.    - HEP C VIRUS ANTIBODY; Future    11. Primary insomnia  Continue trazadone    Any change or worsening of signs or symptoms, patient encouraged to follow-up or report to the emergency room for further evaluation. Patient understands and agrees.    Followup: Return in about 1 year (around 10/17/2020).

## 2019-10-27 LAB
25(OH)D3+25(OH)D2 SERPL-MCNC: 57.5 NG/ML (ref 30–100)
ALBUMIN SERPL-MCNC: 4.2 G/DL (ref 3.5–5.5)
ALBUMIN/GLOB SERPL: 1.8 {RATIO} (ref 1.2–2.2)
ALP SERPL-CCNC: 76 IU/L (ref 39–117)
ALT SERPL-CCNC: 22 IU/L (ref 0–32)
AST SERPL-CCNC: 15 IU/L (ref 0–40)
BASOPHILS # BLD AUTO: 0 X10E3/UL (ref 0–0.2)
BASOPHILS NFR BLD AUTO: 0 %
BILIRUB SERPL-MCNC: 0.4 MG/DL (ref 0–1.2)
BUN SERPL-MCNC: 13 MG/DL (ref 6–24)
BUN/CREAT SERPL: 19 (ref 9–23)
CALCIUM SERPL-MCNC: 9 MG/DL (ref 8.7–10.2)
CHLORIDE SERPL-SCNC: 107 MMOL/L (ref 96–106)
CHOLEST SERPL-MCNC: 161 MG/DL (ref 100–199)
CO2 SERPL-SCNC: 22 MMOL/L (ref 20–29)
CREAT SERPL-MCNC: 0.7 MG/DL (ref 0.57–1)
EOSINOPHIL # BLD AUTO: 0.2 X10E3/UL (ref 0–0.4)
EOSINOPHIL NFR BLD AUTO: 3 %
ERYTHROCYTE [DISTWIDTH] IN BLOOD BY AUTOMATED COUNT: 13.9 % (ref 12.3–15.4)
GLOBULIN SER CALC-MCNC: 2.3 G/DL (ref 1.5–4.5)
GLUCOSE SERPL-MCNC: 88 MG/DL (ref 65–99)
HCT VFR BLD AUTO: 42 % (ref 34–46.6)
HCV AB S/CO SERPL IA: 0.1 S/CO RATIO (ref 0–0.9)
HDLC SERPL-MCNC: 78 MG/DL
HGB BLD-MCNC: 14.2 G/DL (ref 11.1–15.9)
IMM GRANULOCYTES # BLD AUTO: 0 X10E3/UL (ref 0–0.1)
IMM GRANULOCYTES NFR BLD AUTO: 0 %
IMMATURE CELLS  115398: NORMAL
LABORATORY COMMENT REPORT: NORMAL
LDLC SERPL CALC-MCNC: 70 MG/DL (ref 0–99)
LYMPHOCYTES # BLD AUTO: 2 X10E3/UL (ref 0.7–3.1)
LYMPHOCYTES NFR BLD AUTO: 36 %
MCH RBC QN AUTO: 29.3 PG (ref 26.6–33)
MCHC RBC AUTO-ENTMCNC: 33.8 G/DL (ref 31.5–35.7)
MCV RBC AUTO: 87 FL (ref 79–97)
MONOCYTES # BLD AUTO: 0.4 X10E3/UL (ref 0.1–0.9)
MONOCYTES NFR BLD AUTO: 7 %
MORPHOLOGY BLD-IMP: NORMAL
NEUTROPHILS # BLD AUTO: 3 X10E3/UL (ref 1.4–7)
NEUTROPHILS NFR BLD AUTO: 54 %
NRBC BLD AUTO-RTO: NORMAL %
PLATELET # BLD AUTO: 269 X10E3/UL (ref 150–450)
POTASSIUM SERPL-SCNC: 4.1 MMOL/L (ref 3.5–5.2)
PROT SERPL-MCNC: 6.5 G/DL (ref 6–8.5)
RBC # BLD AUTO: 4.84 X10E6/UL (ref 3.77–5.28)
SODIUM SERPL-SCNC: 142 MMOL/L (ref 134–144)
TRIGL SERPL-MCNC: 63 MG/DL (ref 0–149)
TSH SERPL DL<=0.005 MIU/L-ACNC: 2.46 UIU/ML (ref 0.45–4.5)
VIT B12 SERPL-MCNC: 783 PG/ML (ref 232–1245)
VLDLC SERPL CALC-MCNC: 13 MG/DL (ref 5–40)
WBC # BLD AUTO: 5.6 X10E3/UL (ref 3.4–10.8)

## 2019-10-30 ENCOUNTER — TELEPHONE (OUTPATIENT)
Dept: MEDICAL GROUP | Facility: LAB | Age: 59
End: 2019-10-30

## 2019-10-30 NOTE — TELEPHONE ENCOUNTER
----- Message from Yandy Clark M.D. sent at 10/29/2019  9:02 AM PDT -----  Please notify patient of their normal lab results.  Yandy Clark M.D.

## 2019-11-07 RX ORDER — DICLOFENAC SODIUM 75 MG/1
TABLET, DELAYED RELEASE ORAL
Qty: 60 TAB | Refills: 3 | Status: SHIPPED
Start: 2019-11-07 | End: 2020-01-15 | Stop reason: SDUPTHER

## 2020-01-15 DIAGNOSIS — F34.1 DYSTHYMIA: ICD-10-CM

## 2020-01-15 RX ORDER — CITALOPRAM 20 MG/1
TABLET ORAL
Qty: 100 TAB | Refills: 3 | Status: SHIPPED | OUTPATIENT
Start: 2020-01-15 | End: 2020-11-03 | Stop reason: SDUPTHER

## 2020-01-15 RX ORDER — DICLOFENAC SODIUM 75 MG/1
75 TABLET, DELAYED RELEASE ORAL 2 TIMES DAILY PRN
Qty: 200 TAB | Refills: 3 | Status: SHIPPED | OUTPATIENT
Start: 2020-01-15 | End: 2020-07-30

## 2020-01-15 RX ORDER — TRAZODONE HYDROCHLORIDE 50 MG/1
50 TABLET ORAL NIGHTLY PRN
Qty: 100 TAB | Refills: 3 | Status: SHIPPED | OUTPATIENT
Start: 2020-01-15 | End: 2020-10-28

## 2020-04-16 RX ORDER — CHOLECALCIFEROL (VITAMIN D3) 1250 MCG
CAPSULE ORAL
Qty: 12 CAP | Refills: 0 | Status: SHIPPED | OUTPATIENT
Start: 2020-04-16 | End: 2020-07-13

## 2020-07-13 RX ORDER — CHOLECALCIFEROL (VITAMIN D3) 1250 MCG
CAPSULE ORAL
Qty: 12 CAP | Refills: 0 | Status: SHIPPED | OUTPATIENT
Start: 2020-07-13 | End: 2020-10-01

## 2020-07-31 RX ORDER — DICLOFENAC SODIUM 75 MG/1
TABLET, DELAYED RELEASE ORAL
Qty: 60 TAB | Refills: 2 | Status: SHIPPED | OUTPATIENT
Start: 2020-07-31 | End: 2021-03-16

## 2020-10-01 RX ORDER — CHOLECALCIFEROL (VITAMIN D3) 1250 MCG
CAPSULE ORAL
Qty: 12 CAP | Refills: 0 | Status: SHIPPED | OUTPATIENT
Start: 2020-10-01 | End: 2020-12-14 | Stop reason: SDUPTHER

## 2020-10-01 NOTE — TELEPHONE ENCOUNTER
Received request via: Pharmacy    Was the patient seen in the last year in this department? Yes LOV 10/17/2019    Does the patient have an active prescription (recently filled or refills available) for medication(s) requested? No

## 2020-11-03 ENCOUNTER — TELEPHONE (OUTPATIENT)
Dept: MEDICAL GROUP | Facility: LAB | Age: 60
End: 2020-11-03

## 2020-11-03 ENCOUNTER — OFFICE VISIT (OUTPATIENT)
Dept: MEDICAL GROUP | Facility: LAB | Age: 60
End: 2020-11-03
Payer: COMMERCIAL

## 2020-11-03 ENCOUNTER — HOSPITAL ENCOUNTER (OUTPATIENT)
Dept: RADIOLOGY | Facility: MEDICAL CENTER | Age: 60
End: 2020-11-03
Attending: FAMILY MEDICINE
Payer: COMMERCIAL

## 2020-11-03 VITALS
TEMPERATURE: 98.8 F | OXYGEN SATURATION: 97 % | BODY MASS INDEX: 29.25 KG/M2 | HEIGHT: 66 IN | HEART RATE: 58 BPM | WEIGHT: 182 LBS | DIASTOLIC BLOOD PRESSURE: 74 MMHG | SYSTOLIC BLOOD PRESSURE: 130 MMHG | RESPIRATION RATE: 16 BRPM

## 2020-11-03 DIAGNOSIS — G89.29 CHRONIC PAIN OF BOTH KNEES: ICD-10-CM

## 2020-11-03 DIAGNOSIS — Z13.1 SCREENING FOR DIABETES MELLITUS: ICD-10-CM

## 2020-11-03 DIAGNOSIS — M25.561 CHRONIC PAIN OF BOTH KNEES: ICD-10-CM

## 2020-11-03 DIAGNOSIS — F34.1 DYSTHYMIA: ICD-10-CM

## 2020-11-03 DIAGNOSIS — E78.2 MIXED HYPERLIPIDEMIA: ICD-10-CM

## 2020-11-03 DIAGNOSIS — E55.9 VITAMIN D DEFICIENCY: ICD-10-CM

## 2020-11-03 DIAGNOSIS — E53.8 B12 DEFICIENCY: ICD-10-CM

## 2020-11-03 DIAGNOSIS — M25.562 CHRONIC PAIN OF BOTH KNEES: ICD-10-CM

## 2020-11-03 DIAGNOSIS — N39.0 RECURRENT UTI: ICD-10-CM

## 2020-11-03 DIAGNOSIS — Z12.31 ENCOUNTER FOR SCREENING MAMMOGRAM FOR BREAST CANCER: ICD-10-CM

## 2020-11-03 DIAGNOSIS — K21.9 GASTROESOPHAGEAL REFLUX DISEASE WITHOUT ESOPHAGITIS: ICD-10-CM

## 2020-11-03 DIAGNOSIS — N95.9 MENOPAUSAL AND PERIMENOPAUSAL DISORDER: ICD-10-CM

## 2020-11-03 DIAGNOSIS — F51.01 PRIMARY INSOMNIA: ICD-10-CM

## 2020-11-03 PROCEDURE — 73564 X-RAY EXAM KNEE 4 OR MORE: CPT | Mod: RT

## 2020-11-03 PROCEDURE — 99214 OFFICE O/P EST MOD 30 MIN: CPT | Performed by: FAMILY MEDICINE

## 2020-11-03 PROCEDURE — 73564 X-RAY EXAM KNEE 4 OR MORE: CPT | Mod: LT

## 2020-11-03 RX ORDER — LOVASTATIN 20 MG/1
20 TABLET ORAL DAILY
Qty: 90 TAB | Refills: 3 | Status: SHIPPED | OUTPATIENT
Start: 2020-11-03 | End: 2021-09-30 | Stop reason: SDUPTHER

## 2020-11-03 RX ORDER — TRAZODONE HYDROCHLORIDE 50 MG/1
50 TABLET ORAL NIGHTLY PRN
Qty: 90 TAB | Refills: 3 | Status: SHIPPED | OUTPATIENT
Start: 2020-11-03 | End: 2021-09-30 | Stop reason: SDUPTHER

## 2020-11-03 RX ORDER — CITALOPRAM 20 MG/1
TABLET ORAL
Qty: 100 TAB | Refills: 3 | Status: SHIPPED | OUTPATIENT
Start: 2020-11-03 | End: 2021-09-30 | Stop reason: SDUPTHER

## 2020-11-03 RX ORDER — PANTOPRAZOLE SODIUM 40 MG/1
40 TABLET, DELAYED RELEASE ORAL DAILY
Qty: 90 TAB | Refills: 1 | Status: SHIPPED | OUTPATIENT
Start: 2020-11-03 | End: 2021-04-27

## 2020-11-03 RX ORDER — ESTRADIOL 1 MG/1
TABLET ORAL
Qty: 90 TAB | Refills: 3 | Status: SHIPPED | OUTPATIENT
Start: 2020-11-03 | End: 2022-02-08

## 2020-11-03 ASSESSMENT — FIBROSIS 4 INDEX: FIB4 SCORE: 0.71

## 2020-11-03 NOTE — PROGRESS NOTES
Subjective:     Chief Complaint   Patient presents with   • Medication Refill     all to [facility] pharmacy   • GI Problem     collin, benjaminching   • Pain     knee pain and migraines       Ave Dave is a 60 y.o. female here today for evaluation and management of:    Dysthymia  Patient is doing well on her citalopram.  She is stressed about her 's worsening Parkinson's Disease.    Menopausal and perimenopausal disorder  She is currently taking estradiol 1 mg daily.  She missed her mammogram last year but will get one this year.  She is no longer using the vaginal estrogen.    Chronic pain of both knees  Patient is complaining of worsening global pain in both of her knees.  It hurts more when she is standing on them or walking.  She denies any injury to the area.       Allergies   Allergen Reactions   • Ciprofloxacin Swelling   • Nitrofurantoin Anaphylaxis     Throat swelling   • Rocephin  [Ceftriaxone] Anaphylaxis     Throat swelling/hives       Current medicines (including changes today)  Current Outpatient Medications   Medication Sig Dispense Refill   • pantoprazole (PROTONIX) 40 MG Tablet Delayed Response Take 1 Tab by mouth every day. 90 Tab 1   • citalopram (CELEXA) 20 MG Tab TAKE ONE TABLET BY MOUTH DAILY 100 Tab 3   • estradiol (ESTRACE) 1 MG Tab TAKE 1 TABLET BY MOUTH ONCE DAILY 90 Tab 3   • lovastatin (MEVACOR) 20 MG Tab Take 1 Tab by mouth every day. 90 Tab 3   • traZODone (DESYREL) 50 MG Tab Take 1 Tab by mouth at bedtime as needed. FOR SLEEP 90 Tab 3   • Cholecalciferol (VITAMIN D3) 1.25 MG (32777 UT) Cap Take 1 capsule by mouth once a week 12 Cap 0   • diclofenac DR (VOLTAREN) 75 MG Tablet Delayed Response TAKE ONE TABLET BY MOUTH TWICE A DAY AS NEEDED 60 Tab 2   • estradiol (ESTRACE) 0.1 MG/GM vaginal cream Insert 1 g in vagina every Monday, Wednesday, and Friday. 1 Tube 12     No current facility-administered medications for this visit.        She  has a past medical history of B12  "deficiency (3/17/2017), Dysthymia (3/15/2016), Mixed hyperlipidemia (3/17/2017), Recurrent UTI (3/17/2017), Vaginal atrophy (3/15/2016), and Vitamin D deficiency (2/29/2016).    Patient Active Problem List    Diagnosis Date Noted   • Gastroesophageal reflux disease without esophagitis 11/03/2020   • Chronic pain of both knees 11/03/2020   • Renal colic on right side 08/03/2018   • Chronic tension-type headache, not intractable 08/17/2017   • Mixed hyperlipidemia 03/17/2017   • B12 deficiency 03/17/2017   • Primary insomnia 03/17/2017   • Recurrent UTI 03/17/2017   • Dysthymia 03/15/2016   • Vaginal atrophy 03/15/2016   • Menopausal and perimenopausal disorder 03/15/2016   • Vitamin D deficiency 02/29/2016       ROS   No fever or chills.  No nausea or vomiting.  No chest pain or palpitations.  No cough or SOB.  No pain with urination or hematuria.  No black or bloody stools.       Objective:     /74 (BP Location: Right arm, Patient Position: Sitting, BP Cuff Size: Adult)   Pulse (!) 58   Temp 37.1 °C (98.8 °F) (Temporal)   Resp 16   Ht 1.676 m (5' 6\")   Wt 82.6 kg (182 lb)   SpO2 97%  Body mass index is 29.38 kg/m².   Physical Exam:  Well developed, well nourished.  Alert, oriented in no acute distress.  Eye contact is good, speech goal directed, affect calm  Eyes: conjunctiva non-injected, sclera non-icteric.  Neck Supple.  No adenopathy or masses in the neck or supraclavicular regions. No thyromegaly  Lungs: clear to auscultation bilaterally with good excursion. No wheezes or rhonchi  CV: regular rate and rhythm. No murmur  Abdomen: soft, nontender, no masses or organomegaly.  No rebound or guarding  Knees: No erythema/edema/ecchymosis/effusion. Joint line tenderness bilaterally. Patellar grind test negative. Lachman's negative. ROM intact. 5/5 strength bilaterally. 2+ deep tendon reflexes bilaterally.            Assessment and Plan:   The following treatment plan was discussed    1. Gastroesophageal " reflux disease without esophagitis  Start Protonix 40 mg daily.  Patient to let me know if the symptoms do not resolve  - CBC WITH DIFFERENTIAL; Future  - pantoprazole (PROTONIX) 40 MG Tablet Delayed Response; Take 1 Tab by mouth every day.  Dispense: 90 Tab; Refill: 1    2. Recurrent UTI  Patient has antibiotics to start if she becomes symptomatic  - CBC WITH DIFFERENTIAL; Future    3. Chronic pain of both knees  X-ray to assess.  Await results.  - DX-KNEE COMPLETE 4+ LEFT; Future  - DX-KNEE COMPLETE 4+ RIGHT; Future    4. Mixed hyperlipidemia  Check labs.  Await results.  Mediterranean diet discussed  - Comp Metabolic Panel; Future  - Lipid Profile; Future  - TSH; Future  - FREE THYROXINE; Future  - lovastatin (MEVACOR) 20 MG Tab; Take 1 Tab by mouth every day.  Dispense: 90 Tab; Refill: 3    5. Vitamin D deficiency  Vitamin D level and adjust supplementation as needed  - VITAMIN D,25 HYDROXY; Future    6. B12 deficiency  Check labs.  Await results  - VITAMIN B12; Future    7. Primary insomnia  Continue trazodone 50 mg nightly    8. Screening for diabetes mellitus  Screening labs ordered.  Await results for counseling.    - Comp Metabolic Panel; Future    9. Encounter for screening mammogram for breast cancer  Rest the importance of getting her mammogram this year.  - MA-SCREENING MAMMO BILAT W/CAD; Future    10. Dysthymia  This is a chronic medical condition that is currently stable  - citalopram (CELEXA) 20 MG Tab; TAKE ONE TABLET BY MOUTH DAILY  Dispense: 100 Tab; Refill: 3    11. Menopausal and perimenopausal disorder  This is a chronic medical condition that is currently stable  Again discussed risks and benefits of hormone replacement including increased risk of breast cancer and cardiovascular disease.  - estradiol (ESTRACE) 1 MG Tab; TAKE 1 TABLET BY MOUTH ONCE DAILY  Dispense: 90 Tab; Refill: 3    Any change or worsening of signs or symptoms, patient encouraged to follow-up or report to the emergency room  for further evaluation. Patient understands and agrees.    Followup: Return in about 1 year (around 11/3/2021).

## 2020-11-03 NOTE — ASSESSMENT & PLAN NOTE
Patient is complaining of worsening global pain in both of her knees.  It hurts more when she is standing on them or walking.  She denies any injury to the area.

## 2020-11-03 NOTE — ASSESSMENT & PLAN NOTE
She is currently taking estradiol 1 mg daily.  She missed her mammogram last year but will get one this year.  She is no longer using the vaginal estrogen.

## 2020-11-03 NOTE — TELEPHONE ENCOUNTER
----- Message from Yandy Clark M.D. sent at 11/3/2020 11:27 AM PST -----  Please call patient and let her know that she is got significant arthritis in both of her knees.  I would like her to see an orthopedist.  Dr. Hunter comes to [place] or if she would like to see somebody else let me know.  Yandy Clark M.D.

## 2020-11-03 NOTE — PATIENT INSTRUCTIONS
Trastornos por desgaste natural de la rodilla  (Wear and Tear Disorders of the Knee [Arthritis, Osteoarthritis])  Usted presenta lesiones por el desgaste natural de la rodilla (artritis, osteoartritis). Estos cambios los sufriremos todos si vivimos carter larga steve. Se producen por los impactos que recibe a diario la articulación. La extensión de la lesión del cartílago en la rodilla determina la extensión de la cirugía necesaria. Los problemas leves requieren carter recuperación de aproximadamente dos meses. Los problemas más graves demoran varios meses para recuperarse. Cuando los problemas son leves, el cirujano podrá hallar que las superficies del cartílago están gastadas y rugosas. En los casos más graves, el cirujano podrá encontrar que el cartílago se ha gastado completamente y que el hueso está expuesto. También es frecuente hallar trozos sueltos de hueso y cartílago, espolones (crecimiento excesivo del hueso) y lesiones en los meniscos (almohadillas que se encuentran entre los huesos grandes de la pierna). Todos o algunos de estos problemas causan dolor.  Para un problema de desgaste leve, sólo será necesario que el cartílago rugoso sea alisado instrumentalmente y suavizado. Para los problemas más graves con zonas de hueso expuesto, el cirujano podrá utilizar un instrumentos para raspar las superficies del hueso y estimular el crecimiento de un nuevo cartílago. Los cuerpos sueltos y los espolones generalmente se extirpan. Los meniscos gastados pueden ser recortados o reparados.   ACERCA DEL PROCEDIMIENTO DE LA ARTROSCOPÍA  La artroscopía es carter técnica quirúrgica. Le permite al cirujano ortopedista diagnosticar y tratar la lesión de la rodilla con precisión. El cirujano observa en el interior de la rodilla a través de un pequeño dispositivo. Jennifer instrumento es similar a un pequeño telescopio (del tamaño de un lápiz). La artroscopía es menos invasiva que la cirugía abierta de rodilla. Puede esperarse carter  recuperación más rápida. Después del procedimiento lo llevarán a un área de recuperación hasta que hayan desaparecido los efectos de los medicamentos. El profesional comentará los resultados de la prueba con usted.  RECUPERACIÓN  La cantidad de tiempo que pase sin realizar cody actividades habituales antes y después de la artroscopía determinará el tiempo de recuperación. Entre otros factores se incluyen el tipo de lesión, la edad del paciente, el estado físico, las enfermedades que padece y la voluntad del paciente. La reconstrucción de los músculos luego de la artroscopía le asegurará carter mejor recuperación. Siga las indicaciones del profesional. Abiquiu le ayudará a recuperarse rápida y completamente. Use las muletas, jadiel reposo, eleve la pierna, colóquese hielo y jadiel los ejercicios que le fried indicado. Los profesionales lo ayudarán y le aconsejarán que ejercicios debe practicar y otras terapias físicas necesarias para volver a tener movilidad, fuerza muscular y un buen funcionamiento luego de la cirugía. Utilice los medicamentos de venta marie o de prescripción para el dolor, el malestar o la fiebre, según se lo indique el profesional que lo asiste.  SOLICITE ATENCIÓN MÉDICA SI:  · Aumenta el sangrado de la herida (más allá de carter pequeña ronn).  · Presenta enrojecimiento, hinchazón o aumento del dolor en la herida.  · Aparece pus en la herida.  · La temperatura oral se eleva sin motivo por encima de 38,9° C (102° F) o según le indique el profesional que lo asiste.  · Advierte un olor fétido que proviene de la herida o del vendaje.  · Siente dolor intenso al  la rodilla.  SOLICITE ATENCIÓN MÉDICA DE INMEDIATO SI:  · Presenta carter erupción cutánea.  · Tiene dificultad para respirar.  · Algún otro problema de alergia.  ESTÉ SEGURO QUE:   · Comprende las instrucciones para el kathryn médica.  · Controlará baker enfermedad.  · Solicitará atención médica de inmediato según las indicaciones.  Document Released:  09/27/2006 Document Revised: 03/11/2013  [employer]® Patient Information ©2014 [employer].

## 2020-11-03 NOTE — ASSESSMENT & PLAN NOTE
Patient is doing well on her citalopram.  She is stressed about her 's worsening Parkinson's Disease.

## 2020-11-04 DIAGNOSIS — J40 BRONCHITIS: ICD-10-CM

## 2020-11-04 NOTE — TELEPHONE ENCOUNTER
Received request via: Pharmacy    Was the patient seen in the last year in this department? Yes  11/3/20  Does the patient have an active prescription (recently filled or refills available) for medication(s) requested? No

## 2020-11-05 RX ORDER — AMOXICILLIN AND CLAVULANATE POTASSIUM 875; 125 MG/1; MG/1
TABLET, FILM COATED ORAL
Qty: 20 TAB | Refills: 0 | Status: SHIPPED | OUTPATIENT
Start: 2020-11-05 | End: 2021-04-20

## 2020-11-07 ENCOUNTER — HOSPITAL ENCOUNTER (OUTPATIENT)
Dept: LAB | Facility: MEDICAL CENTER | Age: 60
End: 2020-11-07
Attending: FAMILY MEDICINE
Payer: COMMERCIAL

## 2020-11-07 DIAGNOSIS — Z13.1 SCREENING FOR DIABETES MELLITUS: ICD-10-CM

## 2020-11-07 DIAGNOSIS — E78.2 MIXED HYPERLIPIDEMIA: ICD-10-CM

## 2020-11-07 DIAGNOSIS — N39.0 RECURRENT UTI: ICD-10-CM

## 2020-11-07 DIAGNOSIS — K21.9 GASTROESOPHAGEAL REFLUX DISEASE WITHOUT ESOPHAGITIS: ICD-10-CM

## 2020-11-07 DIAGNOSIS — E53.8 B12 DEFICIENCY: ICD-10-CM

## 2020-11-07 LAB
25(OH)D3 SERPL-MCNC: 85 NG/ML (ref 30–100)
ALBUMIN SERPL BCP-MCNC: 4.2 G/DL (ref 3.2–4.9)
ALBUMIN/GLOB SERPL: 1.8 G/DL
ALP SERPL-CCNC: 77 U/L (ref 30–99)
ALT SERPL-CCNC: 23 U/L (ref 2–50)
ANION GAP SERPL CALC-SCNC: 8 MMOL/L (ref 7–16)
AST SERPL-CCNC: 18 U/L (ref 12–45)
BASOPHILS # BLD AUTO: 0.7 % (ref 0–1.8)
BASOPHILS # BLD: 0.03 K/UL (ref 0–0.12)
BILIRUB SERPL-MCNC: 0.5 MG/DL (ref 0.1–1.5)
BUN SERPL-MCNC: 13 MG/DL (ref 8–22)
CALCIUM SERPL-MCNC: 8.8 MG/DL (ref 8.5–10.5)
CHLORIDE SERPL-SCNC: 107 MMOL/L (ref 96–112)
CHOLEST SERPL-MCNC: 164 MG/DL (ref 100–199)
CO2 SERPL-SCNC: 25 MMOL/L (ref 20–33)
CREAT SERPL-MCNC: 0.58 MG/DL (ref 0.5–1.4)
EOSINOPHIL # BLD AUTO: 0.26 K/UL (ref 0–0.51)
EOSINOPHIL NFR BLD: 6 % (ref 0–6.9)
ERYTHROCYTE [DISTWIDTH] IN BLOOD BY AUTOMATED COUNT: 42.8 FL (ref 35.9–50)
FASTING STATUS PATIENT QL REPORTED: NORMAL
GLOBULIN SER CALC-MCNC: 2.4 G/DL (ref 1.9–3.5)
GLUCOSE SERPL-MCNC: 79 MG/DL (ref 65–99)
HCT VFR BLD AUTO: 43.6 % (ref 37–47)
HDLC SERPL-MCNC: 74 MG/DL
HGB BLD-MCNC: 14.1 G/DL (ref 12–16)
IMM GRANULOCYTES # BLD AUTO: 0.01 K/UL (ref 0–0.11)
IMM GRANULOCYTES NFR BLD AUTO: 0.2 % (ref 0–0.9)
LDLC SERPL CALC-MCNC: 80 MG/DL
LYMPHOCYTES # BLD AUTO: 1.87 K/UL (ref 1–4.8)
LYMPHOCYTES NFR BLD: 43.4 % (ref 22–41)
MCH RBC QN AUTO: 29.7 PG (ref 27–33)
MCHC RBC AUTO-ENTMCNC: 32.3 G/DL (ref 33.6–35)
MCV RBC AUTO: 92 FL (ref 81.4–97.8)
MONOCYTES # BLD AUTO: 0.24 K/UL (ref 0–0.85)
MONOCYTES NFR BLD AUTO: 5.6 % (ref 0–13.4)
NEUTROPHILS # BLD AUTO: 1.9 K/UL (ref 2–7.15)
NEUTROPHILS NFR BLD: 44.1 % (ref 44–72)
NRBC # BLD AUTO: 0 K/UL
NRBC BLD-RTO: 0 /100 WBC
PLATELET # BLD AUTO: 155 K/UL (ref 164–446)
PMV BLD AUTO: 13.1 FL (ref 9–12.9)
POTASSIUM SERPL-SCNC: 4 MMOL/L (ref 3.6–5.5)
PROT SERPL-MCNC: 6.6 G/DL (ref 6–8.2)
RBC # BLD AUTO: 4.74 M/UL (ref 4.2–5.4)
SODIUM SERPL-SCNC: 140 MMOL/L (ref 135–145)
T4 FREE SERPL-MCNC: 1.17 NG/DL (ref 0.93–1.7)
TRIGL SERPL-MCNC: 51 MG/DL (ref 0–149)
TSH SERPL DL<=0.005 MIU/L-ACNC: 2.04 UIU/ML (ref 0.38–5.33)
VIT B12 SERPL-MCNC: 477 PG/ML (ref 211–911)
WBC # BLD AUTO: 4.3 K/UL (ref 4.8–10.8)

## 2020-11-07 PROCEDURE — 84439 ASSAY OF FREE THYROXINE: CPT

## 2020-11-07 PROCEDURE — 85025 COMPLETE CBC W/AUTO DIFF WBC: CPT

## 2020-11-07 PROCEDURE — 36415 COLL VENOUS BLD VENIPUNCTURE: CPT

## 2020-11-07 PROCEDURE — 80053 COMPREHEN METABOLIC PANEL: CPT

## 2020-11-07 PROCEDURE — 82607 VITAMIN B-12: CPT

## 2020-11-07 PROCEDURE — 80061 LIPID PANEL: CPT

## 2020-11-07 PROCEDURE — 82306 VITAMIN D 25 HYDROXY: CPT

## 2020-11-07 PROCEDURE — 84443 ASSAY THYROID STIM HORMONE: CPT

## 2020-11-11 ENCOUNTER — TELEPHONE (OUTPATIENT)
Dept: MEDICAL GROUP | Facility: LAB | Age: 60
End: 2020-11-11

## 2020-11-11 NOTE — TELEPHONE ENCOUNTER
----- Message from Yandy Clark M.D. sent at 11/10/2020  8:59 AM PST -----  Her labs look good.  Her thyroid function was normal.  Cholesterol looks good.  No change in medications.

## 2020-11-17 ENCOUNTER — HOSPITAL ENCOUNTER (OUTPATIENT)
Dept: RADIOLOGY | Facility: MEDICAL CENTER | Age: 60
End: 2020-11-17
Attending: FAMILY MEDICINE
Payer: COMMERCIAL

## 2020-11-17 DIAGNOSIS — Z12.31 VISIT FOR SCREENING MAMMOGRAM: ICD-10-CM

## 2020-11-17 PROCEDURE — 77067 SCR MAMMO BI INCL CAD: CPT

## 2020-11-20 ENCOUNTER — TELEPHONE (OUTPATIENT)
Dept: MEDICAL GROUP | Facility: LAB | Age: 60
End: 2020-11-20

## 2020-11-20 DIAGNOSIS — M17.0 PRIMARY OSTEOARTHRITIS OF BOTH KNEES: ICD-10-CM

## 2020-11-20 NOTE — TELEPHONE ENCOUNTER
Called patient to inform her that her mammogram was normal. She asked about her knee x-ray results so I informed her Dr. Clark wanted her to see an orthopedist for significant arthritis in both of her knees. Patient is mostly Telugu speaking so translation was a challenge. I had assistance from Daya in office, but please make sure whoever contacts her next is bilingual. Thank you!        Yandy Clark M.D.   11/3/2020 11:27 AM PST      Please call patient and let her know that she is got significant arthritis in both of her knees.  I would like her to see an orthopedist.  Dr. Hunter comes to [place] or if she would like to see somebody else let me know.  Yandy Clark M.D.

## 2020-11-20 NOTE — TELEPHONE ENCOUNTER
Please associate a code with the referral. Provider preference is already in scheduling instructions. Results were noted on 11/3 but the patient had not been informed until now. Please send referral as urgent so this may be addressed sooner rather than later.     1. Caller Name: Ave                         2. SPECIFIC Action To Be Taken: Referral pending, please sign.    3. Diagnosis/Clinical Reason for Request: significant arthritis of both knees    4. Specialty & Provider Name/Lab/Imaging Location: orthopedics, Dr Clark recommended Dr. Hunter in [place]    5. Is appointment scheduled for requested order/referral: no    Patient was not informed they will receive a return phone call from the office ONLY if there are any questions before processing their request. Advised to call back if they haven't received a call from the referral department in 5 days.

## 2020-12-14 RX ORDER — CHOLECALCIFEROL (VITAMIN D3) 1250 MCG
1 CAPSULE ORAL
Qty: 12 CAP | Refills: 1 | Status: SHIPPED | OUTPATIENT
Start: 2020-12-14 | End: 2022-07-05 | Stop reason: SDUPTHER

## 2021-03-16 RX ORDER — DICLOFENAC SODIUM 75 MG/1
TABLET, DELAYED RELEASE ORAL
Qty: 60 TABLET | Refills: 2 | Status: SHIPPED | OUTPATIENT
Start: 2021-03-16 | End: 2021-09-07

## 2021-04-20 DIAGNOSIS — J40 BRONCHITIS: ICD-10-CM

## 2021-04-20 RX ORDER — AMOXICILLIN AND CLAVULANATE POTASSIUM 875; 125 MG/1; MG/1
TABLET, FILM COATED ORAL
Qty: 20 TABLET | Refills: 1 | Status: SHIPPED | OUTPATIENT
Start: 2021-04-20 | End: 2021-09-13

## 2021-04-20 NOTE — TELEPHONE ENCOUNTER
Received request via: Pharmacy    Was the patient seen in the last year in this department? Yes  11/3/2020  Does the patient have an active prescription (recently filled or refills available) for medication(s) requested? No

## 2021-04-27 DIAGNOSIS — K21.9 GASTROESOPHAGEAL REFLUX DISEASE WITHOUT ESOPHAGITIS: ICD-10-CM

## 2021-04-27 RX ORDER — PANTOPRAZOLE SODIUM 40 MG/1
40 TABLET, DELAYED RELEASE ORAL DAILY
Qty: 90 TABLET | Refills: 1 | Status: SHIPPED | OUTPATIENT
Start: 2021-04-27 | End: 2021-09-30 | Stop reason: SDUPTHER

## 2021-04-27 NOTE — TELEPHONE ENCOUNTER
Received request via: Pharmacy    Was the patient seen in the last year in this department? Yes   LOV: 11/2020    Does the patient have an active prescription (recently filled or refills available) for medication(s) requested? No

## 2021-06-14 RX ORDER — ERGOCALCIFEROL 1.25 MG/1
CAPSULE ORAL
Qty: 12 CAPSULE | Refills: 1 | Status: SHIPPED | OUTPATIENT
Start: 2021-06-14 | End: 2021-09-30 | Stop reason: SDUPTHER

## 2021-06-14 NOTE — TELEPHONE ENCOUNTER
Received request via: Pharmacy    Was the patient seen in the last year in this department? Yes    Does the patient have an active prescription (recently filled or refills available) for medication(s) requested? No     VIT D2 1.25MG (38342 UNITS) 1.25 MG Capsule

## 2021-09-13 DIAGNOSIS — J40 BRONCHITIS: ICD-10-CM

## 2021-09-13 RX ORDER — AMOXICILLIN AND CLAVULANATE POTASSIUM 875; 125 MG/1; MG/1
TABLET, FILM COATED ORAL
Qty: 20 TABLET | Refills: 1 | Status: SHIPPED | OUTPATIENT
Start: 2021-09-13 | End: 2021-09-30 | Stop reason: SDUPTHER

## 2021-09-30 ENCOUNTER — HOSPITAL ENCOUNTER (OUTPATIENT)
Facility: MEDICAL CENTER | Age: 61
End: 2021-09-30
Attending: FAMILY MEDICINE
Payer: COMMERCIAL

## 2021-09-30 ENCOUNTER — HOSPITAL ENCOUNTER (OUTPATIENT)
Dept: LAB | Facility: MEDICAL CENTER | Age: 61
End: 2021-09-30
Attending: FAMILY MEDICINE
Payer: COMMERCIAL

## 2021-09-30 ENCOUNTER — OFFICE VISIT (OUTPATIENT)
Dept: MEDICAL GROUP | Facility: LAB | Age: 61
End: 2021-09-30
Payer: COMMERCIAL

## 2021-09-30 VITALS
WEIGHT: 182 LBS | TEMPERATURE: 97.7 F | SYSTOLIC BLOOD PRESSURE: 120 MMHG | HEIGHT: 66 IN | RESPIRATION RATE: 16 BRPM | HEART RATE: 54 BPM | DIASTOLIC BLOOD PRESSURE: 70 MMHG | OXYGEN SATURATION: 98 % | BODY MASS INDEX: 29.25 KG/M2

## 2021-09-30 DIAGNOSIS — N39.0 RECURRENT UTI: ICD-10-CM

## 2021-09-30 DIAGNOSIS — E78.2 MIXED HYPERLIPIDEMIA: ICD-10-CM

## 2021-09-30 DIAGNOSIS — Z23 NEED FOR VACCINATION: ICD-10-CM

## 2021-09-30 DIAGNOSIS — Z00.00 WELL ADULT EXAM: ICD-10-CM

## 2021-09-30 DIAGNOSIS — E55.9 VITAMIN D DEFICIENCY: ICD-10-CM

## 2021-09-30 DIAGNOSIS — F34.1 DYSTHYMIA: ICD-10-CM

## 2021-09-30 DIAGNOSIS — K21.9 GASTROESOPHAGEAL REFLUX DISEASE WITHOUT ESOPHAGITIS: ICD-10-CM

## 2021-09-30 DIAGNOSIS — Z12.31 ENCOUNTER FOR SCREENING MAMMOGRAM FOR BREAST CANCER: ICD-10-CM

## 2021-09-30 DIAGNOSIS — Z13.1 SCREENING FOR DIABETES MELLITUS: ICD-10-CM

## 2021-09-30 LAB
25(OH)D3 SERPL-MCNC: 63 NG/ML (ref 30–100)
ALBUMIN SERPL BCP-MCNC: 4.1 G/DL (ref 3.2–4.9)
ALBUMIN/GLOB SERPL: 1.7 G/DL
ALP SERPL-CCNC: 69 U/L (ref 30–99)
ALT SERPL-CCNC: 27 U/L (ref 2–50)
ANION GAP SERPL CALC-SCNC: 8 MMOL/L (ref 7–16)
APPEARANCE UR: CLEAR
AST SERPL-CCNC: 22 U/L (ref 12–45)
BASOPHILS # BLD AUTO: 0.4 % (ref 0–1.8)
BASOPHILS # BLD: 0.02 K/UL (ref 0–0.12)
BILIRUB SERPL-MCNC: 0.5 MG/DL (ref 0.1–1.5)
BILIRUB UR STRIP-MCNC: ABNORMAL MG/DL
BUN SERPL-MCNC: 13 MG/DL (ref 8–22)
CALCIUM SERPL-MCNC: 8.8 MG/DL (ref 8.5–10.5)
CHLORIDE SERPL-SCNC: 109 MMOL/L (ref 96–112)
CHOLEST SERPL-MCNC: 169 MG/DL (ref 100–199)
CO2 SERPL-SCNC: 25 MMOL/L (ref 20–33)
COLOR UR AUTO: YELLOW
CREAT SERPL-MCNC: 0.67 MG/DL (ref 0.5–1.4)
EOSINOPHIL # BLD AUTO: 0.2 K/UL (ref 0–0.51)
EOSINOPHIL NFR BLD: 4.2 % (ref 0–6.9)
ERYTHROCYTE [DISTWIDTH] IN BLOOD BY AUTOMATED COUNT: 41.6 FL (ref 35.9–50)
FASTING STATUS PATIENT QL REPORTED: NORMAL
GLOBULIN SER CALC-MCNC: 2.4 G/DL (ref 1.9–3.5)
GLUCOSE SERPL-MCNC: 86 MG/DL (ref 65–99)
GLUCOSE UR STRIP.AUTO-MCNC: ABNORMAL MG/DL
HCT VFR BLD AUTO: 40.9 % (ref 37–47)
HDLC SERPL-MCNC: 74 MG/DL
HGB BLD-MCNC: 13.5 G/DL (ref 12–16)
IMM GRANULOCYTES # BLD AUTO: 0.01 K/UL (ref 0–0.11)
IMM GRANULOCYTES NFR BLD AUTO: 0.2 % (ref 0–0.9)
KETONES UR STRIP.AUTO-MCNC: ABNORMAL MG/DL
LDLC SERPL CALC-MCNC: 84 MG/DL
LEUKOCYTE ESTERASE UR QL STRIP.AUTO: ABNORMAL
LYMPHOCYTES # BLD AUTO: 2.07 K/UL (ref 1–4.8)
LYMPHOCYTES NFR BLD: 43.3 % (ref 22–41)
MCH RBC QN AUTO: 29.7 PG (ref 27–33)
MCHC RBC AUTO-ENTMCNC: 33 G/DL (ref 33.6–35)
MCV RBC AUTO: 90.1 FL (ref 81.4–97.8)
MONOCYTES # BLD AUTO: 0.29 K/UL (ref 0–0.85)
MONOCYTES NFR BLD AUTO: 6.1 % (ref 0–13.4)
NEUTROPHILS # BLD AUTO: 2.19 K/UL (ref 2–7.15)
NEUTROPHILS NFR BLD: 45.8 % (ref 44–72)
NITRITE UR QL STRIP.AUTO: ABNORMAL
NRBC # BLD AUTO: 0 K/UL
NRBC BLD-RTO: 0 /100 WBC
PH UR STRIP.AUTO: 8.5 [PH] (ref 5–8)
PLATELET # BLD AUTO: 193 K/UL (ref 164–446)
PMV BLD AUTO: 11.8 FL (ref 9–12.9)
POTASSIUM SERPL-SCNC: 4.3 MMOL/L (ref 3.6–5.5)
PROT SERPL-MCNC: 6.5 G/DL (ref 6–8.2)
PROT UR QL STRIP: ABNORMAL MG/DL
RBC # BLD AUTO: 4.54 M/UL (ref 4.2–5.4)
RBC UR QL AUTO: ABNORMAL
SODIUM SERPL-SCNC: 142 MMOL/L (ref 135–145)
SP GR UR STRIP.AUTO: 1.02
T4 FREE SERPL-MCNC: 1.27 NG/DL (ref 0.93–1.7)
TRIGL SERPL-MCNC: 56 MG/DL (ref 0–149)
TSH SERPL DL<=0.005 MIU/L-ACNC: 1.69 UIU/ML (ref 0.38–5.33)
UROBILINOGEN UR STRIP-MCNC: 0.2 MG/DL
WBC # BLD AUTO: 4.8 K/UL (ref 4.8–10.8)

## 2021-09-30 PROCEDURE — 36415 COLL VENOUS BLD VENIPUNCTURE: CPT

## 2021-09-30 PROCEDURE — 84439 ASSAY OF FREE THYROXINE: CPT

## 2021-09-30 PROCEDURE — 84443 ASSAY THYROID STIM HORMONE: CPT

## 2021-09-30 PROCEDURE — 81002 URINALYSIS NONAUTO W/O SCOPE: CPT | Performed by: FAMILY MEDICINE

## 2021-09-30 PROCEDURE — 85025 COMPLETE CBC W/AUTO DIFF WBC: CPT

## 2021-09-30 PROCEDURE — 80053 COMPREHEN METABOLIC PANEL: CPT

## 2021-09-30 PROCEDURE — 90686 IIV4 VACC NO PRSV 0.5 ML IM: CPT | Performed by: FAMILY MEDICINE

## 2021-09-30 PROCEDURE — 90471 IMMUNIZATION ADMIN: CPT | Performed by: FAMILY MEDICINE

## 2021-09-30 PROCEDURE — 87086 URINE CULTURE/COLONY COUNT: CPT

## 2021-09-30 PROCEDURE — 99396 PREV VISIT EST AGE 40-64: CPT | Mod: 25 | Performed by: FAMILY MEDICINE

## 2021-09-30 PROCEDURE — 82306 VITAMIN D 25 HYDROXY: CPT

## 2021-09-30 PROCEDURE — 80061 LIPID PANEL: CPT

## 2021-09-30 RX ORDER — AMOXICILLIN AND CLAVULANATE POTASSIUM 875; 125 MG/1; MG/1
TABLET, FILM COATED ORAL
Qty: 20 TABLET | Refills: 1 | Status: SHIPPED | OUTPATIENT
Start: 2021-09-30 | End: 2022-10-10

## 2021-09-30 RX ORDER — PANTOPRAZOLE SODIUM 40 MG/1
40 TABLET, DELAYED RELEASE ORAL DAILY
Qty: 90 TABLET | Refills: 3 | Status: SHIPPED | OUTPATIENT
Start: 2021-09-30 | End: 2022-07-05 | Stop reason: SDUPTHER

## 2021-09-30 RX ORDER — TRAZODONE HYDROCHLORIDE 50 MG/1
50 TABLET ORAL NIGHTLY PRN
Qty: 90 TABLET | Refills: 3 | Status: SHIPPED | OUTPATIENT
Start: 2021-09-30 | End: 2022-07-05 | Stop reason: SDUPTHER

## 2021-09-30 RX ORDER — CITALOPRAM 20 MG/1
TABLET ORAL
Qty: 100 TABLET | Refills: 3 | Status: SHIPPED | OUTPATIENT
Start: 2021-09-30 | End: 2022-07-05 | Stop reason: SDUPTHER

## 2021-09-30 RX ORDER — LOVASTATIN 20 MG/1
20 TABLET ORAL DAILY
Qty: 90 TABLET | Refills: 3 | Status: SHIPPED | OUTPATIENT
Start: 2021-09-30 | End: 2022-07-05 | Stop reason: SDUPTHER

## 2021-09-30 RX ORDER — ERGOCALCIFEROL 1.25 MG/1
50000 CAPSULE ORAL
Qty: 12 CAPSULE | Refills: 1 | Status: SHIPPED | OUTPATIENT
Start: 2021-09-30 | End: 2021-10-05

## 2021-09-30 ASSESSMENT — FIBROSIS 4 INDEX: FIB4 SCORE: 1.48

## 2021-09-30 NOTE — PATIENT INSTRUCTIONS
Cuidados preventivos en las mujeres de 40 a 64 años de edad  Preventive Care 40-64 Years Old, Female  Los cuidados preventivos hacen referencia a las opciones en cuanto a las visitas al médico y al estilo de steve, las cuales pueden promover la daniella y el bienestar. [place] puede comprender lo siguiente:  · Un examen físico anual. [place] también se puede llamar control de bienestar anual.  · Visitas regulares al dentista y exámenes oculares.  · Vacunas.  · Estudios para detectar ciertas enfermedades.  · Opciones saludables de estilo de steve, saeid seguir carter dieta saludable, hacer ejercicio regularmente, no usar drogas ni productos que contengan nicotina y tabaco, y limitar el consumo de alcohol.  ¿Qué puedo esperar para mi visita de cuidado preventivo?  Examen físico  El médico revisará lo siguiente:  · Estatura y peso. [place] se puede usar para calcular el índice de masa corporal (IMC), que indica si tiene un peso saludable.  · Frecuencia cardíaca y presión arterial.  · Piel para detectar manchas anormales.  Asesoramiento  [place] médico puede preguntarle acerca de:  · Consumo de tabaco, alcohol y drogas.  · Gillis bienestar emocional.  · El bienestar en el hogar y marlys relaciones personales.  · Gillis actividad sexual.  · Marlys hábitos de alimentación.  · Gillis trabajo y ambiente laboral.  · Métodos anticonceptivos.  · Gillis ciclo menstrual.  · Marlys antecedentes de embarazo.  ¿Qué vacunas necesito?    Vacuna antigripal  · Se recomienda aplicarse esta vacuna todos los años.  Vacuna contra el tétanos, difteria y tos ferina (Tdap)  · Es posible que tenga que aplicarse un refuerzo contra el tétanos y la difteria (DT) cada 10 años.  Vacuna contra la varicela  · Es posible que tenga que aplicársela si no recibió esta vacuna.  Vacuna contra el herpes zóster (culebrilla)  · Es posible que la necesite después de los 60 años de edad.  Vacuna contra el sarampión, rubéchi y paperas (SRP)  · Es posible que necesite aplicarse al menos carter dosis de la vacuna  SRP si nació después de 1957. Podría también necesitar carter segunda dosis.  Vacuna antineumocócica conjugada (PCV13)  · Puede necesitar esta vacuna si tiene determinadas enfermedades y no se vacunó anteriormente.  Vacuna antineumocócica de polisacáridos (PPSV23)  · Quizás tenga que aplicarse carter o dos dosis si fuma o si tiene determinadas afecciones.  Vacuna antimeningocócica conjugada (MenACWY)  · Puede necesitar esta vacuna si tiene determinadas afecciones.  Vacuna contra la hepatitis A  · Es posible que necesite esta vacuna si tiene ciertas afecciones o si viaja o trabaja en lugares en los que podría estar expuesta a la hepatitis A.  Vacuna contra la hepatitis B  · Es posible que necesite esta vacuna si tiene ciertas afecciones o si viaja o trabaja en lugares en los que podría estar expuesta a la hepatitis B.  Vacuna antihaemophilus influenzae tipo B (Hib)  · Puede necesitar esta vacuna si tiene determinadas afecciones.  Vacuna contra el virus del papiloma humano (VPH)  · Si el médico se lo recomienda, puede necesitar kimberly dosis a lo sara de 6 meses.  Puede recibir las vacunas en forma de dosis individuales o en forma de dos o más vacunas juntas en la misma inyección (vacunas combinadas). Hable con baker médico sobre los riesgos y beneficios de las vacunas combinadas.  ¿Qué pruebas necesito?  Análisis de brian  · Niveles de lípidos y colesterol. Estos se pueden verificar cada 5 años o, con más frecuencia, si usted tiene más de 50 años de edad.  · Análisis de hepatitis C.  · Análisis de hepatitis B.  Pruebas de detección  · Pruebas de detección de cáncer de pulmón. Es posible que se le realice esta prueba de detección a partir de los 55 años de edad, si ying fumado tab 30 años un paquete diario y sigue fumando o dejó el hábito en algún momento en los últimos 15 años.  · Pruebas de detección de cáncer colorrectal. Todos los adultos a partir de los 50 años de edad y hasta los 75 años de edad deben hacerse esta  prueba de detección. El médico puede recomendarle las pruebas de detección a partir de los 45 años de edad si corre un mayor riesgo. Le realizarán pruebas cada 1 a 10 años, según los resultados y el tipo de prueba de detección.  · Pruebas de detección de la diabetes. [place] se realiza mediante un control del azúcar en la brian (glucosa) después de no cata comido tab un periodo de tiempo (ayuno). Es posible que se le realice esta prueba cada 1 a 3 años.  · Mamografía. Se puede realizar cada 1 o 2 años. Hable con baker médico sobre cuándo debe comenzar a realizarse mamografías de manera regular. [place] depende de si tiene antecedentes familiares de cáncer de mama o no.  · Pruebas de detección de cáncer relacionado con las mutaciones del BRCA. Es posible que se las deba realizar si tiene antecedentes de cáncer de mama, de ovario, de trompas o peritoneal.  · Examen pélvico y prueba de Papanicolaou. [place] se puede realizar cada 3 años a partir de los 21 años de edad. A partir de los 30 años, esto se puede realizar cada 5 años si usted se realiza carter prueba de Papanicolaou en combinación con carter prueba de detección del virus del papiloma humano (VPH).  Otras pruebas  · Análisis de enfermedades de transmisión sexual (ETS).  · Densitometría ósea. [place] se realiza para detectar osteoporosis. Se le puede realizar shakila examen de detección si tiene un riesgo alto de tener osteoporosis.  Siga estas instrucciones en baker casa:  Comida y bebida  · Siga carter dieta que incluya frutas frescas y verduras, cereales integrales, lácteos descremados y proteínas magras.  · [place] los suplementos vitamínicos y minerales saeid se lo haya indicado el médico.  · No varghese alcohol si:  ? Baker médico le indica no hacerlo.  ? Está embarazada, puede estar embarazada o está tratando de quedar embarazada.  · Si stefany alcohol:  ? Limite la cantidad que consume de 0 a 1 medida por día.  ? Esté atenta a la cantidad de alcohol que hay en las bebidas que marium. En los  [place], carter medida equivale a carter botella de cerveza de 12 oz (355 ml), un vaso de vino de 5 oz (148 ml) o un vaso de carter bebida alcohólica de kathryn graduación de 1½ oz (44 ml).  Estilo de steve  · Cuídese los dientes y las encías a diario.  · Manténgase activa. [place] al menos 30 minutos de ejercicio 5 o más días de la semana.  · No consuma ningún producto que contenga nicotina o tabaco, saeid cigarrillos, cigarrillos electrónicos y tabaco de mascar. Si necesita ayuda para dejar de fumar, consulte al médico.  · Si es sexualmente activa, practique sexo seguro. Use un condón u otra forma de método anticonceptivo (anticonceptivos) a fin de evitar un embarazo e ITS (infecciones de transmisión sexual).  · Si el médico se lo indicó, tome carter dosis baja de aspirina diariamente a partir de los 50 años de edad.  ¿Cuándo volver?  · Visite al médico carter vez al año para carter visita de control.  · Pregúntele al médico con qué frecuencia debe realizarse un control de la vista y los dientes.  · Mantenga baker esquema de vacunación al día.  Esta información no tiene saeid fin reemplazar el consejo del médico. Asegúrese de hacerle al médico cualquier pregunta que tenga.  Document Released: 05/01/2018 Document Revised: 10/03/2019 Document Reviewed: 10/03/2019  Elsevier Patient Education © 2020 [employer]

## 2021-10-03 LAB
BACTERIA UR CULT: NORMAL
SIGNIFICANT IND 70042: NORMAL
SITE SITE: NORMAL
SOURCE SOURCE: NORMAL

## 2021-11-19 ENCOUNTER — HOSPITAL ENCOUNTER (OUTPATIENT)
Dept: RADIOLOGY | Facility: MEDICAL CENTER | Age: 61
End: 2021-11-19
Attending: FAMILY MEDICINE
Payer: COMMERCIAL

## 2021-11-19 DIAGNOSIS — Z12.31 ENCOUNTER FOR SCREENING MAMMOGRAM FOR BREAST CANCER: ICD-10-CM

## 2021-11-19 PROCEDURE — 77063 BREAST TOMOSYNTHESIS BI: CPT

## 2021-11-23 ENCOUNTER — TELEPHONE (OUTPATIENT)
Dept: MEDICAL GROUP | Facility: LAB | Age: 61
End: 2021-11-23

## 2021-11-23 DIAGNOSIS — Z12.31 ENCOUNTER FOR SCREENING MAMMOGRAM FOR BREAST CANCER: ICD-10-CM

## 2021-11-23 DIAGNOSIS — R92.30 DENSE BREAST TISSUE ON MAMMOGRAM: ICD-10-CM

## 2021-11-24 NOTE — TELEPHONE ENCOUNTER
----- Message from Yandy Clark M.D. sent at 11/23/2021  3:58 PM PST -----  Patient's mammogram was normal although she does have dense breast.  Whole breast ultrasound can be done to further assess her risk.  Otherwise annual follow-up is recommended.  Yandy Clark M.D.

## 2021-11-24 NOTE — TELEPHONE ENCOUNTER
Called and spoke to patient and daughter advising of mammogram results. Patient would like to move forward and complete the whole breast ultrasound.

## 2021-12-02 RX ORDER — DICLOFENAC SODIUM 75 MG/1
75 TABLET, DELAYED RELEASE ORAL 2 TIMES DAILY PRN
Qty: 60 TABLET | Refills: 3 | Status: SHIPPED | OUTPATIENT
Start: 2021-12-02

## 2022-01-17 RX ORDER — ERGOCALCIFEROL 1.25 MG/1
50000 CAPSULE ORAL
Qty: 12 CAPSULE | Refills: 1 | Status: SHIPPED | OUTPATIENT
Start: 2022-01-17 | End: 2023-08-04

## 2022-06-28 ENCOUNTER — TELEPHONE (OUTPATIENT)
Dept: SCHEDULING | Facility: IMAGING CENTER | Age: 62
End: 2022-06-28

## 2022-07-05 ENCOUNTER — OFFICE VISIT (OUTPATIENT)
Dept: MEDICAL GROUP | Facility: LAB | Age: 62
End: 2022-07-05
Payer: COMMERCIAL

## 2022-07-05 VITALS
RESPIRATION RATE: 16 BRPM | OXYGEN SATURATION: 98 % | HEART RATE: 56 BPM | HEIGHT: 66 IN | DIASTOLIC BLOOD PRESSURE: 90 MMHG | BODY MASS INDEX: 30.86 KG/M2 | TEMPERATURE: 97.3 F | SYSTOLIC BLOOD PRESSURE: 142 MMHG | WEIGHT: 192 LBS

## 2022-07-05 DIAGNOSIS — M25.562 CHRONIC PAIN OF BOTH KNEES: ICD-10-CM

## 2022-07-05 DIAGNOSIS — E55.9 VITAMIN D DEFICIENCY: ICD-10-CM

## 2022-07-05 DIAGNOSIS — E78.2 MIXED HYPERLIPIDEMIA: ICD-10-CM

## 2022-07-05 DIAGNOSIS — N95.9 MENOPAUSAL AND PERIMENOPAUSAL DISORDER: ICD-10-CM

## 2022-07-05 DIAGNOSIS — G89.29 CHRONIC PAIN OF BOTH KNEES: ICD-10-CM

## 2022-07-05 DIAGNOSIS — M25.561 CHRONIC PAIN OF BOTH KNEES: ICD-10-CM

## 2022-07-05 DIAGNOSIS — K21.9 GASTROESOPHAGEAL REFLUX DISEASE WITHOUT ESOPHAGITIS: ICD-10-CM

## 2022-07-05 DIAGNOSIS — F51.01 PRIMARY INSOMNIA: ICD-10-CM

## 2022-07-05 DIAGNOSIS — F34.1 DYSTHYMIA: ICD-10-CM

## 2022-07-05 PROCEDURE — 99214 OFFICE O/P EST MOD 30 MIN: CPT | Performed by: PHYSICIAN ASSISTANT

## 2022-07-05 RX ORDER — PANTOPRAZOLE SODIUM 40 MG/1
40 TABLET, DELAYED RELEASE ORAL DAILY
Qty: 90 TABLET | Refills: 3 | Status: SHIPPED | OUTPATIENT
Start: 2022-07-05 | End: 2022-10-10 | Stop reason: SDUPTHER

## 2022-07-05 RX ORDER — ESTRADIOL 1 MG/1
1 TABLET ORAL DAILY
Qty: 90 TABLET | Refills: 1 | Status: SHIPPED | OUTPATIENT
Start: 2022-07-05 | End: 2022-10-10 | Stop reason: SDUPTHER

## 2022-07-05 RX ORDER — TRAZODONE HYDROCHLORIDE 50 MG/1
50 TABLET ORAL NIGHTLY
Qty: 90 TABLET | Refills: 1 | Status: SHIPPED | OUTPATIENT
Start: 2022-07-05 | End: 2022-10-10 | Stop reason: SDUPTHER

## 2022-07-05 RX ORDER — LOVASTATIN 20 MG/1
20 TABLET ORAL DAILY
Qty: 90 TABLET | Refills: 3 | Status: SHIPPED | OUTPATIENT
Start: 2022-07-05 | End: 2022-10-10 | Stop reason: SDUPTHER

## 2022-07-05 RX ORDER — CITALOPRAM 20 MG/1
TABLET ORAL
Qty: 100 TABLET | Refills: 3 | Status: SHIPPED | OUTPATIENT
Start: 2022-07-05 | End: 2023-06-26

## 2022-07-05 RX ORDER — SUCRALFATE 1 G/1
1 TABLET ORAL
Qty: 120 TABLET | Refills: 0 | Status: SHIPPED | OUTPATIENT
Start: 2022-07-05

## 2022-07-05 RX ORDER — CHOLECALCIFEROL (VITAMIN D3) 1250 MCG
1 CAPSULE ORAL
Qty: 12 CAPSULE | Refills: 1 | Status: SHIPPED | OUTPATIENT
Start: 2022-07-05 | End: 2023-01-27 | Stop reason: SDUPTHER

## 2022-07-05 ASSESSMENT — FIBROSIS 4 INDEX: FIB4 SCORE: 1.34

## 2022-07-05 NOTE — PROGRESS NOTES
"Subjective:     CC: med refills    HPI:   Ave here today with    A  was used today in clinic.    Still having acid reflux symptoms with pantoprazole 40mg QD.  Denies dark or bloody stool. Pain is worse after eating and with laying down. Denies unintentional weight loss. Pt notes she has been taking her diclofenac more frequently due to bilateral OA knee pain.     ROS:  Gen: no fevers/chills, no changes in weight  Pulm: no sob, no cough  CV: no chest pain, no palpitations  GI: no nausea/vomiting, no diarrhea    Current Outpatient Medications Ordered in Epic   Medication Sig Dispense Refill   • estradiol (ESTRACE) 1 MG Tab TAKE 1 TABLET BY MOUTH ONCE DAILY 90 Tablet 1   • vitamin D2, Ergocalciferol, (DRISDOL) 1.25 MG (68331 UT) Cap capsule TAKE 1 CAPSULE BY MOUTH EVERY 7 DAYS. 12 Capsule 1   • diclofenac DR (VOLTAREN) 75 MG Tablet Delayed Response Take 1 Tablet by mouth 2 times a day as needed. 60 Tablet 3   • amoxicillin-clavulanate (AUGMENTIN) 875-125 MG Tab TAKE ONE TABLET BY MOUTH TWICE A DAY 20 Tablet 1   • citalopram (CELEXA) 20 MG Tab TAKE ONE TABLET BY MOUTH DAILY 100 Tablet 3   • pantoprazole (PROTONIX) 40 MG Tablet Delayed Response Take 1 Tablet by mouth every day. 90 Tablet 3   • lovastatin (MEVACOR) 20 MG Tab Take 1 Tablet by mouth every day. 90 Tablet 3   • traZODone (DESYREL) 50 MG Tab Take 1 Tablet by mouth at bedtime as needed. FOR SLEEP 90 Tablet 3   • Cholecalciferol (VITAMIN D3) 1.25 MG (27962 UT) Cap Take 1 Cap by mouth every 7 days. 12 Cap 1     No current Epic-ordered facility-administered medications on file.         Objective:     Exam:  BP (!) 142/90   Pulse (!) 56   Temp 36.3 °C (97.3 °F)   Resp 16   Ht 1.676 m (5' 6\")   Wt 87.1 kg (192 lb)   SpO2 98%   BMI 30.99 kg/m²  Body mass index is 30.99 kg/m².    Gen: Alert and oriented, No apparent distress.  Neck: Neck is supple without lymphadenopathy.  Lungs: Normal effort, CTA bilaterally, no wheezes, rhonchi, or " rales  CV: Regular rate and rhythm. No murmurs, rubs, or gallops.  Ext: No clubbing, cyanosis, edema.        Assessment & Plan:     61 y.o. female with the following -     1. Gastroesophageal reflux disease without esophagitis  Chronic condition  Continue pantoprazole 40mg QD, short trial of carafate. Also advised pt to switch from oral to topical diclofenac for knee pain as this may be aggravating her GERD  - sucralfate (CARAFATE) 1 GM Tab; Take 1 Tablet by mouth 4 Times a Day,Before Meals and at Bedtime.  Dispense: 120 Tablet; Refill: 0  - pantoprazole (PROTONIX) 40 MG Tablet Delayed Response; Take 1 Tablet by mouth every day.  Dispense: 90 Tablet; Refill: 3    2. Chronic pain of both knees  Chronic condition  Hold oral diclofenac, switch to topical 1% formulation    3. Primary insomnia  Chronic condition, stable  Continue current meds  - traZODone (DESYREL) 50 MG Tab; Take 1 Tablet by mouth every evening for 180 days. FOR SLEEP  Dispense: 90 Tablet; Refill: 1    4. Mixed hyperlipidemia  Chronic condition, stable  Due for updated labs  - CBC WITH DIFFERENTIAL; Future  - Comp Metabolic Panel; Future  - Lipid Profile; Future  - lovastatin (MEVACOR) 20 MG Tab; Take 1 Tablet by mouth every day.  Dispense: 90 Tablet; Refill: 3    5. BMI 30.0-30.9,adult  Patient's weight was discussed today, including healthy low-carb diet, 30-minutes of moderate exercise daily and avoiding sugars and high-fat foods.    - HEMOGLOBIN A1C; Future    6. Dysthymia  Chronic condition, stable  Continue current meds  - TSH WITH REFLEX TO FT4; Future  - citalopram (CELEXA) 20 MG Tab; TAKE ONE TABLET BY MOUTH DAILY  Dispense: 100 Tablet; Refill: 3    7. Menopausal and perimenopausal disorder  Chronic condition, stable  Continue current meds  - estradiol (ESTRACE) 1 MG Tab; Take 1 Tablet by mouth every day.  Dispense: 90 Tablet; Refill: 1    8. Vitamin D deficiency  Chronic condition, stable  Continue current meds  Due for updated labs  -  Cholecalciferol (VITAMIN D3) 1.25 MG (61317 UT) Cap; Take 1 Capsule by mouth every 7 days.  Dispense: 12 Capsule; Refill: 1  - VITAMIN D,25 HYDROXY; Future      I spent a total of 18 minutes with record review, exam, communication with the patient, communication with other providers, and documentation of this encounter.      No follow-ups on file.    Please note that this dictation was created using voice recognition software. I have made every reasonable attempt to correct obvious errors, but there may be errors of grammar and possibly content that I did not discover before finalizing the note.

## 2022-10-10 ENCOUNTER — OFFICE VISIT (OUTPATIENT)
Dept: MEDICAL GROUP | Facility: LAB | Age: 62
End: 2022-10-10
Payer: COMMERCIAL

## 2022-10-10 ENCOUNTER — HOSPITAL ENCOUNTER (OUTPATIENT)
Facility: MEDICAL CENTER | Age: 62
End: 2022-10-10
Attending: PHYSICIAN ASSISTANT
Payer: COMMERCIAL

## 2022-10-10 ENCOUNTER — HOSPITAL ENCOUNTER (OUTPATIENT)
Dept: LAB | Facility: MEDICAL CENTER | Age: 62
End: 2022-10-10
Attending: PHYSICIAN ASSISTANT
Payer: COMMERCIAL

## 2022-10-10 VITALS
BODY MASS INDEX: 31.18 KG/M2 | HEART RATE: 58 BPM | RESPIRATION RATE: 16 BRPM | TEMPERATURE: 97.5 F | HEIGHT: 66 IN | DIASTOLIC BLOOD PRESSURE: 88 MMHG | OXYGEN SATURATION: 98 % | WEIGHT: 194 LBS | SYSTOLIC BLOOD PRESSURE: 140 MMHG

## 2022-10-10 DIAGNOSIS — E78.2 MIXED HYPERLIPIDEMIA: ICD-10-CM

## 2022-10-10 DIAGNOSIS — N95.9 MENOPAUSAL AND PERIMENOPAUSAL DISORDER: ICD-10-CM

## 2022-10-10 DIAGNOSIS — R30.0 DYSURIA: ICD-10-CM

## 2022-10-10 DIAGNOSIS — K21.9 GASTROESOPHAGEAL REFLUX DISEASE WITHOUT ESOPHAGITIS: ICD-10-CM

## 2022-10-10 DIAGNOSIS — Z12.31 ENCOUNTER FOR SCREENING MAMMOGRAM FOR MALIGNANT NEOPLASM OF BREAST: ICD-10-CM

## 2022-10-10 DIAGNOSIS — F34.1 DYSTHYMIA: ICD-10-CM

## 2022-10-10 DIAGNOSIS — Z23 NEED FOR VACCINATION: ICD-10-CM

## 2022-10-10 DIAGNOSIS — F51.01 PRIMARY INSOMNIA: ICD-10-CM

## 2022-10-10 DIAGNOSIS — E55.9 VITAMIN D DEFICIENCY: ICD-10-CM

## 2022-10-10 LAB
25(OH)D3 SERPL-MCNC: 62 NG/ML (ref 30–100)
ALBUMIN SERPL BCP-MCNC: 3.8 G/DL (ref 3.2–4.9)
ALBUMIN/GLOB SERPL: 1.2 G/DL
ALP SERPL-CCNC: 70 U/L (ref 30–99)
ALT SERPL-CCNC: 16 U/L (ref 2–50)
ANION GAP SERPL CALC-SCNC: 13 MMOL/L (ref 7–16)
APPEARANCE UR: CLEAR
AST SERPL-CCNC: 22 U/L (ref 12–45)
BILIRUB SERPL-MCNC: 0.4 MG/DL (ref 0.1–1.5)
BILIRUB UR STRIP-MCNC: NEGATIVE MG/DL
BUN SERPL-MCNC: 14 MG/DL (ref 8–22)
CALCIUM SERPL-MCNC: 8.8 MG/DL (ref 8.5–10.5)
CHLORIDE SERPL-SCNC: 106 MMOL/L (ref 96–112)
CHOLEST SERPL-MCNC: 181 MG/DL (ref 100–199)
CO2 SERPL-SCNC: 20 MMOL/L (ref 20–33)
COLOR UR AUTO: NORMAL
CREAT SERPL-MCNC: 0.61 MG/DL (ref 0.5–1.4)
GFR SERPLBLD CREATININE-BSD FMLA CKD-EPI: 101 ML/MIN/1.73 M 2
GLOBULIN SER CALC-MCNC: 3.2 G/DL (ref 1.9–3.5)
GLUCOSE SERPL-MCNC: 77 MG/DL (ref 65–99)
GLUCOSE UR STRIP.AUTO-MCNC: NEGATIVE MG/DL
HDLC SERPL-MCNC: 74 MG/DL
KETONES UR STRIP.AUTO-MCNC: NEGATIVE MG/DL
LDLC SERPL CALC-MCNC: 95 MG/DL
LEUKOCYTE ESTERASE UR QL STRIP.AUTO: NORMAL
NITRITE UR QL STRIP.AUTO: NEGATIVE
PH UR STRIP.AUTO: 7 [PH] (ref 5–8)
POTASSIUM SERPL-SCNC: 4 MMOL/L (ref 3.6–5.5)
PROT SERPL-MCNC: 7 G/DL (ref 6–8.2)
PROT UR QL STRIP: NEGATIVE MG/DL
RBC UR QL AUTO: NEGATIVE
SODIUM SERPL-SCNC: 139 MMOL/L (ref 135–145)
SP GR UR STRIP.AUTO: 1.01
TRIGL SERPL-MCNC: 62 MG/DL (ref 0–149)
TSH SERPL DL<=0.005 MIU/L-ACNC: 2.8 UIU/ML (ref 0.38–5.33)
UROBILINOGEN UR STRIP-MCNC: 0.2 MG/DL

## 2022-10-10 PROCEDURE — 99214 OFFICE O/P EST MOD 30 MIN: CPT | Mod: 25 | Performed by: PHYSICIAN ASSISTANT

## 2022-10-10 PROCEDURE — 80053 COMPREHEN METABOLIC PANEL: CPT

## 2022-10-10 PROCEDURE — 80061 LIPID PANEL: CPT

## 2022-10-10 PROCEDURE — 90686 IIV4 VACC NO PRSV 0.5 ML IM: CPT | Performed by: PHYSICIAN ASSISTANT

## 2022-10-10 PROCEDURE — 85025 COMPLETE CBC W/AUTO DIFF WBC: CPT

## 2022-10-10 PROCEDURE — 83036 HEMOGLOBIN GLYCOSYLATED A1C: CPT

## 2022-10-10 PROCEDURE — 81002 URINALYSIS NONAUTO W/O SCOPE: CPT | Performed by: PHYSICIAN ASSISTANT

## 2022-10-10 PROCEDURE — 82306 VITAMIN D 25 HYDROXY: CPT

## 2022-10-10 PROCEDURE — 36415 COLL VENOUS BLD VENIPUNCTURE: CPT

## 2022-10-10 PROCEDURE — 90471 IMMUNIZATION ADMIN: CPT | Performed by: PHYSICIAN ASSISTANT

## 2022-10-10 PROCEDURE — 81003 URINALYSIS AUTO W/O SCOPE: CPT

## 2022-10-10 PROCEDURE — 84443 ASSAY THYROID STIM HORMONE: CPT

## 2022-10-10 RX ORDER — AMOXICILLIN 500 MG/1
500 CAPSULE ORAL 3 TIMES DAILY
Qty: 15 CAPSULE | Refills: 0 | Status: SHIPPED | OUTPATIENT
Start: 2022-10-10 | End: 2022-10-15

## 2022-10-10 RX ORDER — LOVASTATIN 20 MG/1
20 TABLET ORAL DAILY
Qty: 90 TABLET | Refills: 3 | Status: SHIPPED | OUTPATIENT
Start: 2022-10-10 | End: 2023-10-20

## 2022-10-10 RX ORDER — TRAZODONE HYDROCHLORIDE 50 MG/1
50 TABLET ORAL NIGHTLY
Qty: 90 TABLET | Refills: 1 | Status: SHIPPED | OUTPATIENT
Start: 2022-10-10 | End: 2023-04-08

## 2022-10-10 RX ORDER — ESTRADIOL 1 MG/1
1 TABLET ORAL DAILY
Qty: 90 TABLET | Refills: 1 | Status: SHIPPED | OUTPATIENT
Start: 2022-10-10

## 2022-10-10 RX ORDER — PANTOPRAZOLE SODIUM 40 MG/1
40 TABLET, DELAYED RELEASE ORAL DAILY
Qty: 90 TABLET | Refills: 3 | Status: SHIPPED | OUTPATIENT
Start: 2022-10-10 | End: 2022-12-23

## 2022-10-10 ASSESSMENT — FIBROSIS 4 INDEX: FIB4 SCORE: 1.36

## 2022-10-10 NOTE — PROGRESS NOTES
"Subjective:     CC: low back pain    HPI:   Ave here today with     Patient reports 7 days of suprapubic tenderness extending to the right lower back as well as trace pyuria, dysuria and increased urinary frequency.  Denies fever/chills.  Denies nausea/vomiting/diarrhea.  She has not taken anything over-the-counter.    Patient is additionally requesting medication refills as well as flu shot today    ROS:  ROS negative except as indicated above  Current Outpatient Medications Ordered in Epic   Medication Sig Dispense Refill    traZODone (DESYREL) 50 MG Tab Take 1 Tablet by mouth every evening for 180 days. FOR SLEEP 90 Tablet 1    Cholecalciferol (VITAMIN D3) 1.25 MG (76933 UT) Cap Take 1 Capsule by mouth every 7 days. 12 Capsule 1    sucralfate (CARAFATE) 1 GM Tab Take 1 Tablet by mouth 4 Times a Day,Before Meals and at Bedtime. 120 Tablet 0    estradiol (ESTRACE) 1 MG Tab Take 1 Tablet by mouth every day. 90 Tablet 1    pantoprazole (PROTONIX) 40 MG Tablet Delayed Response Take 1 Tablet by mouth every day. 90 Tablet 3    lovastatin (MEVACOR) 20 MG Tab Take 1 Tablet by mouth every day. 90 Tablet 3    citalopram (CELEXA) 20 MG Tab TAKE ONE TABLET BY MOUTH DAILY 100 Tablet 3    diclofenac sodium (VOLTAREN) 1 % Gel Apply 2 g topically 4 times a day as needed (knee pain). 100 g 0    vitamin D2, Ergocalciferol, (DRISDOL) 1.25 MG (17861 UT) Cap capsule TAKE 1 CAPSULE BY MOUTH EVERY 7 DAYS. 12 Capsule 1    diclofenac DR (VOLTAREN) 75 MG Tablet Delayed Response Take 1 Tablet by mouth 2 times a day as needed. 60 Tablet 3    amoxicillin-clavulanate (AUGMENTIN) 875-125 MG Tab TAKE ONE TABLET BY MOUTH TWICE A DAY (Patient not taking: Reported on 10/10/2022) 20 Tablet 1     No current Epic-ordered facility-administered medications on file.           Objective:     Exam:  BP (!) 140/88   Pulse (!) 58   Temp 36.4 °C (97.5 °F)   Resp 16   Ht 1.676 m (5' 6\")   Wt 88 kg (194 lb)   SpO2 98%   BMI 31.31 kg/m²  Body mass index " is 31.31 kg/m².    Gen: Alert and oriented, No apparent distress.  Neck: Neck is supple without lymphadenopathy.  Lungs: Normal effort, CTA bilaterally, no wheezes, rhonchi, or rales  CV: Regular rate and rhythm. No murmurs, rubs, or gallops.  Ext: No clubbing, cyanosis, edema.  Torso: No CVA tenderness, tender to palpation in suprapubic region          Assessment & Plan:     62 y.o. female with the following -     1. Dysuria  Acute condition   dipstick urinalysis positive for leukocytes.  We will start empiric treatment.  Urine culture pending  - POCT Urinalysis  - amoxicillin (AMOXIL) 500 MG Cap; Take 1 Capsule by mouth 3 times a day for 5 days.  Dispense: 15 Capsule; Refill: 0  - URINALYSIS,CULTURE IF INDICATED; Future    2. Encounter for screening mammogram for malignant neoplasm of breast  - MA-SCREENING MAMMO BILAT W/TOMOSYNTHESIS W/CAD; Future    3. Menopausal and perimenopausal disorder  - estradiol (ESTRACE) 1 MG Tab; Take 1 Tablet by mouth every day.  Dispense: 90 Tablet; Refill: 1    4. Mixed hyperlipidemia  - lovastatin (MEVACOR) 20 MG Tab; Take 1 Tablet by mouth every day.  Dispense: 90 Tablet; Refill: 3    5. Gastroesophageal reflux disease without esophagitis  - pantoprazole (PROTONIX) 40 MG Tablet Delayed Response; Take 1 Tablet by mouth every day.  Dispense: 90 Tablet; Refill: 3    6. Primary insomnia  - traZODone (DESYREL) 50 MG Tab; Take 1 Tablet by mouth every evening for 180 days. FOR SLEEP  Dispense: 90 Tablet; Refill: 1    7. Need for vaccination  - Influenza Vaccine Quad Injection (PF)        I spent a total of 20 minutes with record review, exam, communication with the patient, communication with other providers, and documentation of this encounter.      No follow-ups on file.    Please note that this dictation was created using voice recognition software. I have made every reasonable attempt to correct obvious errors, but there may be errors of grammar and possibly content that I did not  discover before finalizing the note.

## 2022-10-11 LAB
APPEARANCE UR: CLEAR
BILIRUB UR QL STRIP.AUTO: NEGATIVE
COLOR UR: YELLOW
GLUCOSE UR STRIP.AUTO-MCNC: NEGATIVE MG/DL
KETONES UR STRIP.AUTO-MCNC: NEGATIVE MG/DL
LEUKOCYTE ESTERASE UR QL STRIP.AUTO: NEGATIVE
MICRO URNS: NORMAL
NITRITE UR QL STRIP.AUTO: NEGATIVE
PH UR STRIP.AUTO: 7 [PH] (ref 5–8)
PROT UR QL STRIP: NEGATIVE MG/DL
RBC UR QL AUTO: NEGATIVE
SP GR UR STRIP.AUTO: 1.02
UROBILINOGEN UR STRIP.AUTO-MCNC: 0.2 MG/DL

## 2022-10-12 LAB
EST. AVERAGE GLUCOSE BLD GHB EST-MCNC: NORMAL MG/DL
HBA1C MFR BLD: NORMAL % (ref 4–5.6)

## 2022-10-15 ENCOUNTER — HOSPITAL ENCOUNTER (OUTPATIENT)
Dept: LAB | Facility: MEDICAL CENTER | Age: 62
End: 2022-10-15
Attending: PHYSICIAN ASSISTANT
Payer: COMMERCIAL

## 2022-10-15 LAB
BASOPHILS # BLD AUTO: 0.7 % (ref 0–1.8)
BASOPHILS # BLD: 0.04 K/UL (ref 0–0.12)
EOSINOPHIL # BLD AUTO: 0.3 K/UL (ref 0–0.51)
EOSINOPHIL NFR BLD: 5.5 % (ref 0–6.9)
ERYTHROCYTE [DISTWIDTH] IN BLOOD BY AUTOMATED COUNT: 42 FL (ref 35.9–50)
HCT VFR BLD AUTO: 43.8 % (ref 37–47)
HGB BLD-MCNC: 14.5 G/DL (ref 12–16)
IMM GRANULOCYTES # BLD AUTO: 0.01 K/UL (ref 0–0.11)
IMM GRANULOCYTES NFR BLD AUTO: 0.2 % (ref 0–0.9)
LYMPHOCYTES # BLD AUTO: 2.09 K/UL (ref 1–4.8)
LYMPHOCYTES NFR BLD: 38.6 % (ref 22–41)
MCH RBC QN AUTO: 29.8 PG (ref 27–33)
MCHC RBC AUTO-ENTMCNC: 33.1 G/DL (ref 33.6–35)
MCV RBC AUTO: 89.9 FL (ref 81.4–97.8)
MONOCYTES # BLD AUTO: 0.3 K/UL (ref 0–0.85)
MONOCYTES NFR BLD AUTO: 5.5 % (ref 0–13.4)
NEUTROPHILS # BLD AUTO: 2.67 K/UL (ref 2–7.15)
NEUTROPHILS NFR BLD: 49.5 % (ref 44–72)
NRBC # BLD AUTO: 0 K/UL
NRBC BLD-RTO: 0 /100 WBC
PLATELET # BLD AUTO: 223 K/UL (ref 164–446)
PMV BLD AUTO: 11.8 FL (ref 9–12.9)
RBC # BLD AUTO: 4.87 M/UL (ref 4.2–5.4)
WBC # BLD AUTO: 5.4 K/UL (ref 4.8–10.8)

## 2022-10-15 PROCEDURE — 85025 COMPLETE CBC W/AUTO DIFF WBC: CPT

## 2022-10-15 PROCEDURE — 36415 COLL VENOUS BLD VENIPUNCTURE: CPT

## 2022-11-22 ENCOUNTER — APPOINTMENT (OUTPATIENT)
Dept: RADIOLOGY | Facility: MEDICAL CENTER | Age: 62
End: 2022-11-22
Attending: PHYSICIAN ASSISTANT
Payer: COMMERCIAL

## 2022-11-22 DIAGNOSIS — Z12.31 ENCOUNTER FOR SCREENING MAMMOGRAM FOR MALIGNANT NEOPLASM OF BREAST: ICD-10-CM

## 2022-11-22 PROCEDURE — 77067 SCR MAMMO BI INCL CAD: CPT

## 2022-12-23 ENCOUNTER — OFFICE VISIT (OUTPATIENT)
Dept: MEDICAL GROUP | Facility: LAB | Age: 62
End: 2022-12-23
Payer: COMMERCIAL

## 2022-12-23 ENCOUNTER — HOSPITAL ENCOUNTER (OUTPATIENT)
Facility: MEDICAL CENTER | Age: 62
End: 2022-12-23
Attending: FAMILY MEDICINE
Payer: COMMERCIAL

## 2022-12-23 VITALS
HEART RATE: 60 BPM | TEMPERATURE: 98 F | RESPIRATION RATE: 16 BRPM | DIASTOLIC BLOOD PRESSURE: 80 MMHG | BODY MASS INDEX: 31.34 KG/M2 | HEIGHT: 66 IN | WEIGHT: 195 LBS | OXYGEN SATURATION: 96 % | SYSTOLIC BLOOD PRESSURE: 130 MMHG

## 2022-12-23 DIAGNOSIS — R10.13 EPIGASTRIC PAIN: ICD-10-CM

## 2022-12-23 DIAGNOSIS — R14.0 ABDOMINAL BLOATING: ICD-10-CM

## 2022-12-23 LAB — H PYLORI AG STL QL IA: NOT DETECTED

## 2022-12-23 PROCEDURE — 99214 OFFICE O/P EST MOD 30 MIN: CPT | Performed by: FAMILY MEDICINE

## 2022-12-23 PROCEDURE — 87338 HPYLORI STOOL AG IA: CPT

## 2022-12-23 RX ORDER — OMEPRAZOLE 40 MG/1
40 CAPSULE, DELAYED RELEASE ORAL DAILY
Qty: 30 CAPSULE | Refills: 3 | Status: SHIPPED | OUTPATIENT
Start: 2022-12-23 | End: 2023-06-26

## 2022-12-23 RX ORDER — FAMOTIDINE 40 MG/1
40 TABLET, FILM COATED ORAL DAILY
Qty: 60 TABLET | Refills: 1 | Status: SHIPPED | OUTPATIENT
Start: 2022-12-23 | End: 2023-05-16

## 2022-12-23 ASSESSMENT — FIBROSIS 4 INDEX: FIB4 SCORE: 1.53

## 2022-12-23 NOTE — PROGRESS NOTES
Subjective:     Chief Complaint   Patient presents with    Gastrophageal Reflux     Nausea, mucus white in color, metal taste in mouth, cough intermittent         HPI:   Ave presents today with possible GERD. Has some feelings of metallic taste, nausea, acid feeling in the throat.   Started in October. She was seen and still there. Lots of burping and coughing intermittently. Tried some medications then, but this didn't change things.Tried pantoprazole for 2-3 weeks and nothing changed.   Possibly slightly better but persistent. No diet changes. Worse in AM and as soon as she eats its better.   Reports upper abdominal pain, chest pain, gas, uncomfortable. Some bloating as well. No diarrhea, no constipation.     Has had hysterectomy for endometriosis around age 35 or so. Ovaries out as well.     Current Outpatient Medications Ordered in Epic   Medication Sig Dispense Refill    diclofenac sodium (VOLTAREN) 1 % Gel Apply 2 g topically 4 times a day as needed (knee pain). 100 g 0    estradiol (ESTRACE) 1 MG Tab Take 1 Tablet by mouth every day. 90 Tablet 1    lovastatin (MEVACOR) 20 MG Tab Take 1 Tablet by mouth every day. 90 Tablet 3    pantoprazole (PROTONIX) 40 MG Tablet Delayed Response Take 1 Tablet by mouth every day. 90 Tablet 3    traZODone (DESYREL) 50 MG Tab Take 1 Tablet by mouth every evening for 180 days. FOR SLEEP 90 Tablet 1    Cholecalciferol (VITAMIN D3) 1.25 MG (51870 UT) Cap Take 1 Capsule by mouth every 7 days. 12 Capsule 1    sucralfate (CARAFATE) 1 GM Tab Take 1 Tablet by mouth 4 Times a Day,Before Meals and at Bedtime. 120 Tablet 0    citalopram (CELEXA) 20 MG Tab TAKE ONE TABLET BY MOUTH DAILY 100 Tablet 3    vitamin D2, Ergocalciferol, (DRISDOL) 1.25 MG (42543 UT) Cap capsule TAKE 1 CAPSULE BY MOUTH EVERY 7 DAYS. 12 Capsule 1    diclofenac DR (VOLTAREN) 75 MG Tablet Delayed Response Take 1 Tablet by mouth 2 times a day as needed. 60 Tablet 3     No current Epic-ordered facility-administered  "medications on file.         ROS:  Gen: no fevers/chills, no changes in weight  Eyes: no changes in vision  ENT: no sore throat, no hearing loss, no bloody nose      : no dysuria  MSk: no myalgias  Skin: no rash  Neuro: no headaches, no numbness/tingling  Heme/Lymph: no easy bruising      Objective:     Exam:  /80 (BP Location: Left arm, Patient Position: Sitting, BP Cuff Size: Large adult)   Pulse 60   Temp 36.7 °C (98 °F) (Temporal)   Resp 16   Ht 1.676 m (5' 6\")   Wt 88.5 kg (195 lb)   SpO2 96%   BMI 31.47 kg/m²  Body mass index is 31.47 kg/m².    Gen: Alert and oriented, No apparent distress.  Neck: Neck is supple without lymphadenopathy.  Lungs: Normal effort, CTA bilaterally, no wheezes, rhonchi, or rales  CV: Regular rate and rhythm. No murmurs, rubs, or gallops.  Ext: No clubbing, cyanosis, edema.  Abdomen: Bowel sounds positive, soft, nondistended nontender.    Assessment & Plan:     62 y.o. female with the following -     1. Epigastric pain  We did discuss epigastric pain as well as possible ulcer.  She seems to feel worse in the morning and then feels a little bit better after eating.  I do think this probably is an overproduction of acid but may be an ulcer disease.  I do get some H. pylori testing as well as an ultrasound to further evaluate things.  We did discuss seeing gastroenterology as well.  She is placed on famotidine and omeprazole as she has failed pantoprazole by itself.  With a history of endometriosis it is possible the she might have some further disease that was not caught by hysterectomy and this could be a issue for her.  - US-ABDOMEN COMPLETE SURVEY; Future  - famotidine (PEPCID) 40 MG Tab; Take 1 Tablet by mouth every day.  Dispense: 60 Tablet; Refill: 1  - omeprazole (PRILOSEC) 40 MG delayed-release capsule; Take 1 Capsule by mouth every day.  Dispense: 30 Capsule; Refill: 3  - H.PYLORI STOOL ANTIGEN; Future  - Referral to Gastroenterology    2. Abdominal bloating  We " will treat accordingly based on ultrasound.  I do not think this is really a gallbladder issue but I would like to just get a survey of her abdominal organs anyway for further assessment.  - US-ABDOMEN COMPLETE SURVEY; Future  - Referral to Gastroenterology            No follow-ups on file.    Please note that this dictation was created using voice recognition software. I have made every reasonable attempt to correct obvious errors, but I expect that there are errors of grammar and possibly content that I did not discover before finalizing the note.

## 2023-01-09 ENCOUNTER — APPOINTMENT (OUTPATIENT)
Dept: RADIOLOGY | Facility: MEDICAL CENTER | Age: 63
End: 2023-01-09
Attending: FAMILY MEDICINE
Payer: COMMERCIAL

## 2023-01-27 DIAGNOSIS — E55.9 VITAMIN D DEFICIENCY: ICD-10-CM

## 2023-01-27 RX ORDER — CHOLECALCIFEROL (VITAMIN D3) 1250 MCG
1 CAPSULE ORAL
Qty: 12 CAPSULE | Refills: 1 | Status: SHIPPED | OUTPATIENT
Start: 2023-01-27 | End: 2023-08-04

## 2023-01-27 NOTE — TELEPHONE ENCOUNTER
Received request via: Pharmacy    Was the patient seen in the last year in this department? Yes  12/23/2022  Does the patient have an active prescription (recently filled or refills available) for medication(s) requested? No    Does the patient have group home Plus and need 100 day supply (blood pressure, diabetes and cholesterol meds only)? Patient does not have SCP

## 2023-01-31 ENCOUNTER — APPOINTMENT (OUTPATIENT)
Dept: RADIOLOGY | Facility: MEDICAL CENTER | Age: 63
End: 2023-01-31
Attending: FAMILY MEDICINE
Payer: COMMERCIAL

## 2023-01-31 DIAGNOSIS — R10.13 EPIGASTRIC PAIN: ICD-10-CM

## 2023-01-31 DIAGNOSIS — R14.0 ABDOMINAL BLOATING: ICD-10-CM

## 2023-01-31 PROCEDURE — 76700 US EXAM ABDOM COMPLETE: CPT

## 2023-02-01 ENCOUNTER — TELEPHONE (OUTPATIENT)
Dept: MEDICAL GROUP | Facility: LAB | Age: 63
End: 2023-02-01
Payer: COMMERCIAL

## 2023-02-01 NOTE — TELEPHONE ENCOUNTER
Pt notified      ----- Message from Luz Lane M.D. sent at 2/1/2023  9:25 AM PST -----  Results were released to [facility].     Ultrasound of the abdomen was normal.  No masses appreciated.  No concerns with any organs present.  Continue with plans for GI referral and medication sent in.    Thank you,   Luz Lane MD

## 2023-03-27 NOTE — ASSESSMENT & PLAN NOTE
Patient has a history of primary insomnia with difficulty getting to sleep most nights. She was started on trazodone and that has been very effective in treating this. Patient reports she wakes rested.   Topical Sulfur Applications Pregnancy And Lactation Text: This medication is Pregnancy Category C and has an unknown safety profile during pregnancy. It is unknown if this topical medication is excreted in breast milk.

## 2023-04-21 ENCOUNTER — DOCUMENTATION (OUTPATIENT)
Dept: HEALTH INFORMATION MANAGEMENT | Facility: OTHER | Age: 63
End: 2023-04-21
Payer: COMMERCIAL

## 2023-04-26 ENCOUNTER — HOSPITAL ENCOUNTER (OUTPATIENT)
Dept: LAB | Facility: MEDICAL CENTER | Age: 63
End: 2023-04-26
Attending: INTERNAL MEDICINE
Payer: COMMERCIAL

## 2023-05-16 DIAGNOSIS — R10.13 EPIGASTRIC PAIN: ICD-10-CM

## 2023-05-16 RX ORDER — FAMOTIDINE 40 MG/1
TABLET, FILM COATED ORAL
Qty: 60 TABLET | Refills: 1 | Status: SHIPPED | OUTPATIENT
Start: 2023-05-16

## 2023-05-16 NOTE — TELEPHONE ENCOUNTER
Received request via: Pharmacy    Was the patient seen in the last year in this department? Yes  LOV 12/23/2022  Does the patient have an active prescription (recently filled or refills available) for medication(s) requested? No    Does the patient have retirement Plus and need 100 day supply (blood pressure, diabetes and cholesterol meds only)? Medication is not for cholesterol, blood pressure or diabetes and Patient does not have SCP

## 2023-05-17 ENCOUNTER — HOSPITAL ENCOUNTER (OUTPATIENT)
Dept: LAB | Facility: MEDICAL CENTER | Age: 63
End: 2023-05-17
Attending: INTERNAL MEDICINE
Payer: COMMERCIAL

## 2023-05-17 PROCEDURE — 91065 BREATH HYDROGEN/METHANE TEST: CPT | Mod: 91

## 2023-05-17 PROCEDURE — 36415 COLL VENOUS BLD VENIPUNCTURE: CPT

## 2023-05-30 LAB — TEST NAME 95000: NORMAL

## 2023-06-26 ENCOUNTER — OFFICE VISIT (OUTPATIENT)
Dept: MEDICAL GROUP | Facility: LAB | Age: 63
End: 2023-06-26
Payer: COMMERCIAL

## 2023-06-26 VITALS
SYSTOLIC BLOOD PRESSURE: 120 MMHG | DIASTOLIC BLOOD PRESSURE: 76 MMHG | HEART RATE: 66 BPM | WEIGHT: 190 LBS | RESPIRATION RATE: 15 BRPM | OXYGEN SATURATION: 96 % | HEIGHT: 66 IN | BODY MASS INDEX: 30.53 KG/M2 | TEMPERATURE: 97.1 F

## 2023-06-26 DIAGNOSIS — R39.9 UTI SYMPTOMS: ICD-10-CM

## 2023-06-26 DIAGNOSIS — N95.9 MENOPAUSAL AND PERIMENOPAUSAL DISORDER: ICD-10-CM

## 2023-06-26 DIAGNOSIS — F34.1 DYSTHYMIA: ICD-10-CM

## 2023-06-26 LAB
APPEARANCE UR: NORMAL
BILIRUB UR STRIP-MCNC: NEGATIVE MG/DL
COLOR UR AUTO: YELLOW
GLUCOSE UR STRIP.AUTO-MCNC: NEGATIVE MG/DL
KETONES UR STRIP.AUTO-MCNC: NEGATIVE MG/DL
LEUKOCYTE ESTERASE UR QL STRIP.AUTO: NORMAL
NITRITE UR QL STRIP.AUTO: NEGATIVE
PH UR STRIP.AUTO: 6 [PH] (ref 5–8)
PROT UR QL STRIP: NORMAL MG/DL
RBC UR QL AUTO: NORMAL
SP GR UR STRIP.AUTO: 1.01
UROBILINOGEN UR STRIP-MCNC: 0.2 MG/DL

## 2023-06-26 PROCEDURE — 3074F SYST BP LT 130 MM HG: CPT | Performed by: PHYSICIAN ASSISTANT

## 2023-06-26 PROCEDURE — 3078F DIAST BP <80 MM HG: CPT | Performed by: PHYSICIAN ASSISTANT

## 2023-06-26 PROCEDURE — 99214 OFFICE O/P EST MOD 30 MIN: CPT | Performed by: PHYSICIAN ASSISTANT

## 2023-06-26 PROCEDURE — 81002 URINALYSIS NONAUTO W/O SCOPE: CPT | Performed by: PHYSICIAN ASSISTANT

## 2023-06-26 RX ORDER — CITALOPRAM HYDROBROMIDE 10 MG/1
TABLET ORAL
Qty: 45 TABLET | Refills: 0 | Status: SHIPPED | OUTPATIENT
Start: 2023-06-26

## 2023-06-26 RX ORDER — TRAZODONE HYDROCHLORIDE 50 MG/1
TABLET ORAL
COMMUNITY
Start: 2023-04-14 | End: 2023-07-05

## 2023-06-26 RX ORDER — RABEPRAZOLE SODIUM 20 MG/1
20 TABLET, DELAYED RELEASE ORAL EVERY MORNING
COMMUNITY
Start: 2023-06-01 | End: 2023-06-26

## 2023-06-26 RX ORDER — SULFAMETHOXAZOLE AND TRIMETHOPRIM 800; 160 MG/1; MG/1
1 TABLET ORAL EVERY 12 HOURS
Qty: 6 TABLET | Refills: 0 | Status: SHIPPED | OUTPATIENT
Start: 2023-06-26 | End: 2023-06-29

## 2023-06-26 RX ORDER — RABEPRAZOLE SODIUM 20 MG/1
20 TABLET, DELAYED RELEASE ORAL 2 TIMES DAILY
Qty: 180 TABLET | Refills: 0 | Status: SHIPPED | OUTPATIENT
Start: 2023-06-26 | End: 2023-10-31

## 2023-06-26 ASSESSMENT — FIBROSIS 4 INDEX: FIB4 SCORE: 1.53

## 2023-06-26 NOTE — PROGRESS NOTES
"Chief Complaint   Patient presents with    UTI     X  burning when urinating 1 week or so, flank pain, Frequency and feeling of not emptying bladder.        HPI:  Symptom onset: 7 days ago   Current symptoms: Painful, urgent, frequent voids. No blood noted in urine.  She also endorses nausea, some suprapubic discomfort, constipation  Since onset symptoms are: Unchanged  Treatments tried: OTC antispasm med.  Associated symptoms: Negative for fever, flank pain, nausea and vomiting, vaginal discharge, pelvic pain.  History is negative for frequent UTI.       Dysthymia  Patient has been on citalopram 20 mg daily \"for some time \".  She feels like her symptoms are fairly minimal at this time and would like to discuss tapering off of this medication.    Menopausal disorder/HRT  Patient like to discontinue estradiol and discuss how to go about this    ROS:  Denies fever, chills, vomiting or abdominal pain.     OBJECTIVE:  /76   Pulse 66   Temp 36.2 °C (97.1 °F) (Temporal)   Resp 15   Ht 1.676 m (5' 5.98\")   Wt 86.2 kg (190 lb)   SpO2 96%   General: NAD   HEENT: PERRL; EOMI, nl conjunctiva & lids, hearing grossly nl, EACs and TMs nl, good dentition, OP clear   Neck: supple, no thyromegaly or nodules, no LAD   Lungs: CTA bilaterally, Nl I:E ratio   CV: RRR. No M/R/G, no edema   Abd: NABS, S/NT/ND, no masses or HSM   MSK: grossly normal   Neuro: Normal cognition, CNs grossly intact, nl strength, nl gait   Psych: normal judgment, insight, mood, affect   Skin: no rashes, ecchymoses, suspicious lesions       Lab Results   Component Value Date    POCCOLOR Yellow 06/26/2023    POCAPPEAR Cloudy 06/26/2023    POCLEUKEST Moderate 06/26/2023    POCNITRITE Negative 06/26/2023    POCUROBILIGE 0.2 06/26/2023    POCPROTEIN Trace 06/26/2023    POCURPH 6.0 06/26/2023    POCBLOOD Moderate 06/26/2023    POCSPGRV 1.010 06/26/2023    POCKETONES Negative 06/26/2023    POCBILIRUBIN Negative 06/26/2023    POCGLUCUA Negative 06/26/2023 "          ASSESSMENT/PLAN:     1. UTI symptoms  1. Abnormal urine dipstick in office. Urine sent for culture. Start antibiotics.  2. Provided education to drink plenty of fluids, wipe front to back every void and bowel movement.   3. Return to clinic if symptoms not improving within 3-4 days or in case of vomiting, fever, increasing pain.  - POCT Urinalysis  - sulfamethoxazole-trimethoprim (BACTRIM DS) 800-160 MG tablet; Take 1 Tablet by mouth every 12 hours for 3 days.  Dispense: 6 Tablet; Refill: 0  - URINALYSIS,CULTURE IF INDICATED; Future    2. Dysthymia  Chronic condition  Discussed need for slow taper off of Celexa.  We will titrate patient down to 15 mg daily for the next month and then reassess.  Plan to decrease dosage by 5 mg every 4 weeks  - citalopram (CELEXA) 10 MG tablet; 1.5 tablets per day for 30 days  Dispense: 45 Tablet; Refill: 0    3. Menopausal and perimenopausal disorder  Current condition  Given the risks and benefits of hormone replacement therapy I believe patient is a good candidate for discontinuing it at her current age.  Discussed risks and benefits of medication  Plan to decrease estradiol to 0.5 mg daily for 4 weeks and then discontinue

## 2023-06-27 ENCOUNTER — TELEPHONE (OUTPATIENT)
Dept: MEDICAL GROUP | Facility: LAB | Age: 63
End: 2023-06-27
Payer: COMMERCIAL

## 2023-06-27 DIAGNOSIS — R39.9 UTI SYMPTOMS: ICD-10-CM

## 2023-06-27 NOTE — TELEPHONE ENCOUNTER
Please advise if you would like for the  patient to come back to office for a recollect. Per the lab they stated the culture was not sent out.

## 2023-06-29 ENCOUNTER — HOSPITAL ENCOUNTER (OUTPATIENT)
Facility: MEDICAL CENTER | Age: 63
End: 2023-06-29
Attending: PHYSICIAN ASSISTANT
Payer: COMMERCIAL

## 2023-06-29 DIAGNOSIS — R39.9 UTI SYMPTOMS: ICD-10-CM

## 2023-06-29 LAB
APPEARANCE UR: CLEAR
BILIRUB UR QL STRIP.AUTO: NEGATIVE
COLOR UR: YELLOW
GLUCOSE UR STRIP.AUTO-MCNC: NEGATIVE MG/DL
KETONES UR STRIP.AUTO-MCNC: NEGATIVE MG/DL
LEUKOCYTE ESTERASE UR QL STRIP.AUTO: NEGATIVE
MICRO URNS: NORMAL
NITRITE UR QL STRIP.AUTO: NEGATIVE
PH UR STRIP.AUTO: 6.5 [PH] (ref 5–8)
PROT UR QL STRIP: NEGATIVE MG/DL
RBC UR QL AUTO: NEGATIVE
SP GR UR STRIP.AUTO: 1
UROBILINOGEN UR STRIP.AUTO-MCNC: 0.2 MG/DL

## 2023-06-29 PROCEDURE — 81003 URINALYSIS AUTO W/O SCOPE: CPT

## 2023-07-05 RX ORDER — TRAZODONE HYDROCHLORIDE 50 MG/1
TABLET ORAL
Qty: 90 TABLET | Refills: 1 | Status: SHIPPED | OUTPATIENT
Start: 2023-07-05 | End: 2024-01-08

## 2023-08-04 RX ORDER — ERGOCALCIFEROL 1.25 MG/1
50000 CAPSULE ORAL
Qty: 12 CAPSULE | Refills: 1 | Status: SHIPPED | OUTPATIENT
Start: 2023-08-04

## 2023-08-04 NOTE — TELEPHONE ENCOUNTER
Received request via: Pharmacy    Was the patient seen in the last year in this department? Yes  6/26/23  Does the patient have an active prescription (recently filled or refills available) for medication(s) requested? No    Does the patient have [facility] and need 100 day supply (blood pressure, diabetes and cholesterol meds only)? Medication is not for cholesterol, blood pressure or diabetes

## 2023-08-28 ENCOUNTER — OFFICE VISIT (OUTPATIENT)
Dept: MEDICAL GROUP | Facility: LAB | Age: 63
End: 2023-08-28
Payer: COMMERCIAL

## 2023-08-28 VITALS
DIASTOLIC BLOOD PRESSURE: 82 MMHG | OXYGEN SATURATION: 94 % | SYSTOLIC BLOOD PRESSURE: 128 MMHG | BODY MASS INDEX: 31.82 KG/M2 | HEIGHT: 65 IN | RESPIRATION RATE: 16 BRPM | HEART RATE: 67 BPM | TEMPERATURE: 98.4 F | WEIGHT: 191 LBS

## 2023-08-28 DIAGNOSIS — R05.1 ACUTE COUGH: ICD-10-CM

## 2023-08-28 PROCEDURE — 3079F DIAST BP 80-89 MM HG: CPT | Performed by: PHYSICIAN ASSISTANT

## 2023-08-28 PROCEDURE — 99214 OFFICE O/P EST MOD 30 MIN: CPT | Performed by: PHYSICIAN ASSISTANT

## 2023-08-28 PROCEDURE — 3074F SYST BP LT 130 MM HG: CPT | Performed by: PHYSICIAN ASSISTANT

## 2023-08-28 RX ORDER — DOXYCYCLINE 100 MG/1
100 CAPSULE ORAL 2 TIMES DAILY
Qty: 10 CAPSULE | Refills: 0 | Status: SHIPPED | OUTPATIENT
Start: 2023-08-28 | End: 2023-09-02

## 2023-08-28 RX ORDER — ALBUTEROL SULFATE 90 UG/1
2 AEROSOL, METERED RESPIRATORY (INHALATION) EVERY 4 HOURS PRN
Qty: 8.5 G | Refills: 0 | Status: SHIPPED | OUTPATIENT
Start: 2023-08-28 | End: 2023-10-20

## 2023-08-28 RX ORDER — METHYLPREDNISOLONE 4 MG/1
TABLET ORAL
Qty: 21 TABLET | Refills: 0 | Status: SHIPPED | OUTPATIENT
Start: 2023-08-28

## 2023-08-28 ASSESSMENT — PATIENT HEALTH QUESTIONNAIRE - PHQ9
SUM OF ALL RESPONSES TO PHQ9 QUESTIONS 1 AND 2: 0
7. TROUBLE CONCENTRATING ON THINGS, SUCH AS READING THE NEWSPAPER OR WATCHING TELEVISION: NOT AT ALL
SUM OF ALL RESPONSES TO PHQ QUESTIONS 1-9: 0
8. MOVING OR SPEAKING SO SLOWLY THAT OTHER PEOPLE COULD HAVE NOTICED. OR THE OPPOSITE, BEING SO FIGETY OR RESTLESS THAT YOU HAVE BEEN MOVING AROUND A LOT MORE THAN USUAL: NOT AT ALL
2. FEELING DOWN, DEPRESSED, IRRITABLE, OR HOPELESS: NOT AT ALL
1. LITTLE INTEREST OR PLEASURE IN DOING THINGS: NOT AT ALL
4. FEELING TIRED OR HAVING LITTLE ENERGY: NOT AT ALL
6. FEELING BAD ABOUT YOURSELF - OR THAT YOU ARE A FAILURE OR HAVE LET YOURSELF OR YOUR FAMILY DOWN: NOT AL ALL
9. THOUGHTS THAT YOU WOULD BE BETTER OFF DEAD, OR OF HURTING YOURSELF: NOT AT ALL
5. POOR APPETITE OR OVEREATING: NOT AT ALL
3. TROUBLE FALLING OR STAYING ASLEEP OR SLEEPING TOO MUCH: NOT AT ALL

## 2023-08-28 ASSESSMENT — FIBROSIS 4 INDEX: FIB4 SCORE: 1.55

## 2023-08-28 NOTE — PROGRESS NOTES
Subjective:     CC: Chronic cough    HPI:   Ave here today with     Chronic cough  Coughing up phlegm - thick white -seems to improve as the day goes on.  Patient also notes congestion of the chest, some wheezing.  Denies sick contacts patient does note 1 episode of fever and chills approximately 2 weeks ago.  Appetite is ok   Doing well with fluid intake    OTC: tylenol cold and flu        ROS:  Gen: no fevers/chills, no changes in weight  Eyes: no changes in vision  ENT: no sore throat, no hearing loss, no bloody nose  Pulm: no sob, no cough  CV: no chest pain, no palpitations  GI: no nausea/vomiting, no diarrhea  : no dysuria  MSk: no myalgias  Skin: no rash  Neuro: no headaches, no numbness/tingling  Heme/Lymph: no easy bruising    Current Outpatient Medications Ordered in Epic   Medication Sig Dispense Refill    methylPREDNISolone (MEDROL DOSEPAK) 4 MG Tablet Therapy Pack Per  directions on package 21 Tablet 0    albuterol (PROAIR HFA) 108 (90 Base) MCG/ACT Aero Soln inhalation aerosol Inhale 2 Puffs every four hours as needed for Shortness of Breath. 8.5 g 0    doxycycline (MONODOX) 100 MG capsule Take 1 Capsule by mouth 2 times a day for 5 days. 10 Capsule 0    vitamin D2, Ergocalciferol, (DRISDOL) 1.25 MG (16334 UT) Cap capsule TAKE ONE CAPSULE BY MOUTH EVERY SEVEN DAYS 12 Capsule 1    traZODone (DESYREL) 50 MG Tab TAKE 1 TABLET BY MOUTH EVERY EVENING FOR SLEEP 90 Tablet 1    citalopram (CELEXA) 10 MG tablet 1.5 tablets per day for 30 days 45 Tablet 0    rabeprazole (ACIPHEX) 20 MG tablet Take 1 Tablet by mouth 2 times a day for 90 days. 180 Tablet 0    famotidine (PEPCID) 40 MG Tab TAKE ONE TABLET BY MOUTH DAILY 60 Tablet 1    diclofenac sodium (VOLTAREN) 1 % Gel Apply 2 g topically 4 times a day as needed (knee pain). 100 g 0    estradiol (ESTRACE) 1 MG Tab Take 1 Tablet by mouth every day. 90 Tablet 1    lovastatin (MEVACOR) 20 MG Tab Take 1 Tablet by mouth every day. 90 Tablet 3     "sucralfate (CARAFATE) 1 GM Tab Take 1 Tablet by mouth 4 Times a Day,Before Meals and at Bedtime. 120 Tablet 0    diclofenac DR (VOLTAREN) 75 MG Tablet Delayed Response Take 1 Tablet by mouth 2 times a day as needed. 60 Tablet 3     No current Epic-ordered facility-administered medications on file.       Health Maintenance: Completed    Objective:     Exam:  /82 (BP Location: Left arm, Patient Position: Sitting, BP Cuff Size: Adult)   Pulse 67   Temp 36.9 °C (98.4 °F) (Temporal)   Resp 16   Ht 1.651 m (5' 5\")   Wt 86.6 kg (191 lb)   SpO2 94%   BMI 31.78 kg/m²  Body mass index is 31.78 kg/m².    Gen: Alert and oriented, No apparent distress.  Neck: Neck is supple without lymphadenopathy.  Lungs: Normal effort, CTA bilaterally, trace wheezes in right lung field, no rhonchi, or rales  CV: Regular rate and rhythm. No murmurs, rubs, or gallops.  Ext: No clubbing, cyanosis, edema.      Assessment & Plan:     63 y.o. female with the following -     1. Acute cough  Suspect acute cough resolving secondary to recent illness.  Given wheezing today noted on exam we will also evaluate for pneumonia with chest x-ray.  Start doxycycline, Medrol Dosepak, albuterol.  Discussed red flags indications for close follow-up  - DX-CHEST-2 VIEWS; Future  - methylPREDNISolone (MEDROL DOSEPAK) 4 MG Tablet Therapy Pack; Per  directions on package  Dispense: 21 Tablet; Refill: 0  - albuterol (PROAIR HFA) 108 (90 Base) MCG/ACT Aero Soln inhalation aerosol; Inhale 2 Puffs every four hours as needed for Shortness of Breath.  Dispense: 8.5 g; Refill: 0  - doxycycline (MONODOX) 100 MG capsule; Take 1 Capsule by mouth 2 times a day for 5 days.  Dispense: 10 Capsule; Refill: 0        I spent a total of 15 minutes with record review, exam, communication with the patient, communication with other providers, and documentation of this encounter.      No follow-ups on file.    Please note that this dictation was created using voice " recognition software. I have made every reasonable attempt to correct obvious errors, but there may be errors of grammar and possibly content that I did not discover before finalizing the note.

## 2023-08-29 ENCOUNTER — APPOINTMENT (OUTPATIENT)
Dept: RADIOLOGY | Facility: MEDICAL CENTER | Age: 63
End: 2023-08-29
Attending: PHYSICIAN ASSISTANT
Payer: COMMERCIAL

## 2023-08-29 ENCOUNTER — TELEPHONE (OUTPATIENT)
Dept: MEDICAL GROUP | Facility: LAB | Age: 63
End: 2023-08-29

## 2023-08-29 DIAGNOSIS — R05.1 ACUTE COUGH: ICD-10-CM

## 2023-08-29 PROCEDURE — 71046 X-RAY EXAM CHEST 2 VIEWS: CPT

## 2023-08-29 NOTE — TELEPHONE ENCOUNTER
Lvm to pt call back for results        ----- Message from Saida Kelly P.A.-C. sent at 8/29/2023  8:57 AM PDT -----  Please advise patient xray was negative for pneumonia or evidence of infection

## 2023-09-05 ENCOUNTER — TELEPHONE (OUTPATIENT)
Dept: MEDICAL GROUP | Facility: LAB | Age: 63
End: 2023-09-05

## 2023-09-05 NOTE — TELEPHONE ENCOUNTER
Ave Julieta Dave      [phone] (home)     Lucio, Pt.'s spouse, calling stating Patient was in 5-7 days ago.  Pt. Had pnuemonia and was given medication.    Pt. Is still coughing, but getting better. Patient is still having pain in upper back and chest.    Pharmacy: [facility] in [place]    Lucio is asking if she needs more medication or another appointment.    Please contact.

## 2023-09-05 NOTE — TELEPHONE ENCOUNTER
Please advise pt that coughing may persist for the next few weeks. Would advise continue albuterol inhaler and OTC cough and cold medicine. If no improvement or worsening symptoms in the next week, would advise follow up

## 2023-09-06 ENCOUNTER — TELEPHONE (OUTPATIENT)
Dept: MEDICAL GROUP | Facility: LAB | Age: 63
End: 2023-09-06
Payer: COMMERCIAL

## 2023-09-06 NOTE — TELEPHONE ENCOUNTER
Phone Number Called: [phone] (home)      Call outcome: Spoke to patient regarding message below.    Message: call pt back regarding her concern about her persistent symptoms gave her the advice from Saida to continue use of albuterol inhaler and to use OTC cough and cold medication and if symptoms don't improve or get worse to make a follow-up appointment.

## 2023-10-18 DIAGNOSIS — R05.1 ACUTE COUGH: ICD-10-CM

## 2023-10-20 DIAGNOSIS — E78.2 MIXED HYPERLIPIDEMIA: ICD-10-CM

## 2023-10-20 RX ORDER — ALBUTEROL SULFATE 90 UG/1
2 AEROSOL, METERED RESPIRATORY (INHALATION) EVERY 4 HOURS PRN
Qty: 6.7 G | Refills: 0 | Status: SHIPPED | OUTPATIENT
Start: 2023-10-20

## 2023-10-20 RX ORDER — LOVASTATIN 20 MG/1
20 TABLET ORAL DAILY
Qty: 90 TABLET | Refills: 3 | Status: SHIPPED | OUTPATIENT
Start: 2023-10-20

## 2023-10-31 RX ORDER — RABEPRAZOLE SODIUM 20 MG/1
20 TABLET, DELAYED RELEASE ORAL 2 TIMES DAILY
Qty: 180 TABLET | Refills: 0 | Status: SHIPPED | OUTPATIENT
Start: 2023-10-31

## 2024-01-08 RX ORDER — TRAZODONE HYDROCHLORIDE 50 MG/1
TABLET ORAL
Qty: 90 TABLET | Refills: 1 | Status: SHIPPED | OUTPATIENT
Start: 2024-01-08

## 2024-01-08 NOTE — TELEPHONE ENCOUNTER
Received request via: Pharmacy    Was the patient seen in the last year in this department? Yes    Does the patient have an active prescription (recently filled or refills available) for medication(s) requested? No    Does the patient have [facility] and need 100 day supply (blood pressure, diabetes and cholesterol meds only)? Patient does not have [facility]    trazodone 50 mg tablet